# Patient Record
Sex: MALE | Race: BLACK OR AFRICAN AMERICAN | NOT HISPANIC OR LATINO | URBAN - METROPOLITAN AREA
[De-identification: names, ages, dates, MRNs, and addresses within clinical notes are randomized per-mention and may not be internally consistent; named-entity substitution may affect disease eponyms.]

---

## 2021-03-10 ENCOUNTER — LAB OUTSIDE AN ENCOUNTER (OUTPATIENT)
Dept: URBAN - METROPOLITAN AREA TELEHEALTH 2 | Facility: TELEHEALTH | Age: 51
End: 2021-03-10

## 2021-03-10 ENCOUNTER — OFFICE VISIT (OUTPATIENT)
Dept: URBAN - METROPOLITAN AREA TELEHEALTH 2 | Facility: TELEHEALTH | Age: 51
End: 2021-03-10
Payer: COMMERCIAL

## 2021-03-10 DIAGNOSIS — I10 ELEVATED BLOOD PRESSURE: ICD-10-CM

## 2021-03-10 DIAGNOSIS — E78.5 HYPERLIPIDEMIA: ICD-10-CM

## 2021-03-10 DIAGNOSIS — Z79.899 HIGH RISK MEDICATION USE: ICD-10-CM

## 2021-03-10 DIAGNOSIS — B18.1 CHRONIC HEPATITIS B: ICD-10-CM

## 2021-03-10 PROBLEM — 428283002: Status: ACTIVE | Noted: 2021-03-10

## 2021-03-10 PROCEDURE — 99214 OFFICE O/P EST MOD 30 MIN: CPT

## 2021-03-10 RX ORDER — TENOFOVIR ALAFENAMIDE 25 MG/1
TAKE 1 TABLET (25 MG) BY ORAL ROUTE ONCE DAILY FOR 30 DAYS TABLET ORAL 1
Qty: 30 | Refills: 5 | Status: DISCONTINUED | COMMUNITY
Start: 2017-12-19

## 2021-03-10 NOTE — HPI-OTHER HISTORIES
This is a scheduled follow-up appointment for this patient, a 50 year old /Black male, after a previous visit on dec 2017, for an evaluation for hepatitis B on treatment evaluation.   Pt has been lost to follow up since 2017.   from his wife but trying to get back.  He is working now in [employer] in [place],  Hoping to get back to [place] this year.  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  He has a new insurance since when last saw.  Need to get labs and need and an u.s.  He is living [place].  Prior to the vemlidy for 2 years were on Viread continuously on this x 5 yrs.   Nov 7 2017: wbc 5.8 hg 15.5 plat 206, glu 119 and little up. Bun 11 cr 1.10, na 144, k 4.2, alb 4.6, tb 0.5, alk 74, ast 24 and alt 19 and hbv dna less than10 and detected.  and amylase and lipase normal. April 2017: wbc 6.8, hg 16.5 plat 224. glu 102, bun 12 cr 1.06, na 142 k 4.4, ast 18 and alt 23 hbv dna not detected. cpk 130 and anymlase and lipase. 73 and 19.   U.s Dec 2017 liver coarsened byt no cirrhotic. Spleen normal.   April 2017 u/s: exam within normal limits. Vessels patent. No gallstones and no liver lesions. Spleen not enlarged.  HBV E antigen is still positive as of that check.   Sept 2015: wbc 7.4, hg 16.3, plat 249. glu 81, cr 1.27, na 144 k 4.3, alk 81 and ast and alt 26 and 24. HBV pcr not detected.   June 30 2015: wbc 6.0, hg 15.8, plat 226, glu 83, bun 11, cr 1.27, ast 27 and alt 22. tb 0.5. HBV less than20 not detected. afp 2.7. eag pos and eab neg.   Stressed to pt the need for social distancing and strict handwashing and wearing a mask and to follow any other new or added CDC recommendations as this is an evolving target.  Duration of visit  30 minutes via [employer] with greater than 50% of time spent reviewing chart and events with pt.

## 2021-04-05 ENCOUNTER — OFFICE VISIT (OUTPATIENT)
Dept: URBAN - METROPOLITAN AREA CLINIC 83 | Facility: CLINIC | Age: 51
End: 2021-04-05

## 2021-05-17 ENCOUNTER — OFFICE VISIT (OUTPATIENT)
Dept: URBAN - METROPOLITAN AREA CLINIC 83 | Facility: CLINIC | Age: 51
End: 2021-05-17

## 2021-06-09 ENCOUNTER — OFFICE VISIT (OUTPATIENT)
Dept: URBAN - METROPOLITAN AREA TELEHEALTH 2 | Facility: TELEHEALTH | Age: 51
End: 2021-06-09

## 2021-06-10 ENCOUNTER — LAB OUTSIDE AN ENCOUNTER (OUTPATIENT)
Dept: URBAN - METROPOLITAN AREA TELEHEALTH 2 | Facility: TELEHEALTH | Age: 51
End: 2021-06-10

## 2021-07-02 ENCOUNTER — OFFICE VISIT (OUTPATIENT)
Dept: URBAN - METROPOLITAN AREA CLINIC 91 | Facility: CLINIC | Age: 51
End: 2021-07-02
Payer: COMMERCIAL

## 2021-07-02 DIAGNOSIS — B18.1 CHRONIC HEPATITIS B: ICD-10-CM

## 2021-07-02 PROCEDURE — 76705 ECHO EXAM OF ABDOMEN: CPT

## 2021-07-02 PROCEDURE — 93975 VASCULAR STUDY: CPT

## 2021-07-04 ENCOUNTER — TELEPHONE ENCOUNTER (OUTPATIENT)
Dept: URBAN - METROPOLITAN AREA CLINIC 92 | Facility: CLINIC | Age: 51
End: 2021-07-04

## 2021-07-04 NOTE — HPI-TODAY'S VISIT:
Dear Dev Hercules, July 2 ultrasound back.  They compared it to your prior 2017 scan.  Body of pancreas was unremarkable but portions of it were not well seen due to gas. Liver remains homogeneous with no discrete liver lesion.  Very important to do this every 6 months. Liver vessels patent. Gallbladder unremarkable with no stones seen.  Common bile duct normal at 3 mm. Right kidney 10.3 cm with no hydronephrosis. Spleen 11.9 cm. Awaiting labs to correlate to this. Dr. Clark

## 2021-08-02 ENCOUNTER — LAB OUTSIDE AN ENCOUNTER (OUTPATIENT)
Dept: URBAN - METROPOLITAN AREA TELEHEALTH 2 | Facility: TELEHEALTH | Age: 51
End: 2021-08-02

## 2021-08-02 ENCOUNTER — OFFICE VISIT (OUTPATIENT)
Dept: URBAN - METROPOLITAN AREA TELEHEALTH 2 | Facility: TELEHEALTH | Age: 51
End: 2021-08-02
Payer: COMMERCIAL

## 2021-08-02 DIAGNOSIS — E66.3 OVERWEIGHT: ICD-10-CM

## 2021-08-02 DIAGNOSIS — I10 ELEVATED BLOOD PRESSURE: ICD-10-CM

## 2021-08-02 DIAGNOSIS — Z79.899 HIGH RISK MEDICATION USE: ICD-10-CM

## 2021-08-02 DIAGNOSIS — B18.1 CHRONIC HEPATITIS B: ICD-10-CM

## 2021-08-02 PROCEDURE — 99214 OFFICE O/P EST MOD 30 MIN: CPT

## 2021-08-02 NOTE — HPI-OTHER HISTORIES
This is a scheduled follow-up appointment for this patient, a 50 year old /Black male, after a previous visit on March 2021 and prior dec 2017, for an evaluation for hepatitis B on treatment evaluation.   Pt had been lost to follow up since 2017 and march 2021.   from his wife but trying to get back but stil  but close. Support each other.  He still working now in [employer] in [place].  Hoping to get back to [place] this year and says that may happen in 4m.  Prior to the vemlidy for 2 years were on Viread continuously on this x 5 yrs.   He has been off the medicine for a while.   July 2 ultrasound back.  They compared it to your prior 2017 scan.  Body of pancreas was unremarkable but portions of it were not well seen due to gas. Liver remains homogeneous with no discrete liver lesion.  Very important to do this every 6 months. Liver vessels patent. Gallbladder unremarkable with no stones seen.  Common bile duct normal at 3 mm. Right kidney 10.3 cm with no hydronephrosis. Spleen 11.9 cm.  He says he thought he did the labs.  We need the labs asap.  Nov 7 2017: wbc 5.8 hg 15.5 plat 206, glu 119 and little up. Bun 11 cr 1.10, na 144, k 4.2, alb 4.6, tb 0.5, alk 74, ast 24 and alt 19 and hbv dna less than10 and detected.  and amylase and lipase normal. April 2017: wbc 6.8, hg 16.5 plat 224. glu 102, bun 12 cr 1.06, na 142 k 4.4, ast 18 and alt 23 hbv dna not detected. cpk 130 and anymlase and lipase. 73 and 19.   U.s Dec 2017 liver coarsened byt no cirrhotic. Spleen normal.   April 2017 u/s: exam within normal limits. Vessels patent. No gallstones and no liver lesions. Spleen not enlarged.  HBV E antigen is still positive as of that check.   Sept 2015: wbc 7.4, hg 16.3, plat 249. glu 81, cr 1.27, na 144 k 4.3, alk 81 and ast and alt 26 and 24. HBV pcr not detected.   June 30 2015: wbc 6.0, hg 15.8, plat 226, glu 83, bun 11, cr 1.27, ast 27 and alt 22. tb 0.5. HBV less than20 not detected. afp 2.7. eag pos and eab neg.   Plan: 1. Do the labs now to be sure. 2. If dna is elevated and pending labs then we can try to get back on the vemlidy as had renal issues on viread and better on the other. 3. We need the labs. 4.  If the labs show issue then can do this.  Stressed to pt the need for social distancing and strict handwashing and wearing a mask and to follow any other new or added CDC recommendations as this is an evolving target.  Duration of visit 30 minutes total with 5 min of prep and then 25 min via [employer] from 205 to 230 pm with greater than 50% of time spent reviewing chart and events with pt.

## 2021-08-20 ENCOUNTER — OFFICE VISIT (OUTPATIENT)
Dept: URBAN - METROPOLITAN AREA CLINIC 42 | Facility: CLINIC | Age: 51
End: 2021-08-20

## 2021-08-20 VITALS — HEIGHT: 71 IN

## 2021-08-21 ENCOUNTER — TELEPHONE ENCOUNTER (OUTPATIENT)
Dept: URBAN - METROPOLITAN AREA CLINIC 92 | Facility: CLINIC | Age: 51
End: 2021-08-21

## 2021-08-21 LAB
A/G RATIO: 1.9
ALBUMIN: 4.4
ALKALINE PHOSPHATASE: 90
ALT (SGPT): 17
AST (SGOT): 22
BASO (ABSOLUTE): 0.1
BASOS: 1
BILIRUBIN, TOTAL: 0.2
BUN/CREATININE RATIO: 11
BUN: 11
CALCIUM: 9.7
CARBON DIOXIDE, TOTAL: 27
CHLORIDE: 105
CREATININE: 1.02
EGFR IF AFRICN AM: 97
EGFR IF NONAFRICN AM: 84
EOS (ABSOLUTE): 0.1
EOS: 2
GLOBULIN, TOTAL: 2.3
GLUCOSE: 80
HBSAG SCREEN: POSITIVE
HBV LOG10: 1.84
HEMATOCRIT: 48.1
HEMATOLOGY COMMENTS:: (no result)
HEMOGLOBIN: 16.1
HEPATITIS B QUANTITATION: 70
HEPATITIS B SURF AB QUANT: <3.1
IMMATURE CELLS: (no result)
IMMATURE GRANS (ABS): 0
IMMATURE GRANULOCYTES: 0
LYMPHS (ABSOLUTE): 2
LYMPHS: 34
MCH: 31
MCHC: 33.5
MCV: 93
MONOCYTES(ABSOLUTE): 0.5
MONOCYTES: 8
NEUTROPHILS (ABSOLUTE): 3.2
NEUTROPHILS: 55
NRBC: (no result)
PLATELETS: 236
POTASSIUM: 4.2
PROTEIN, TOTAL: 6.7
RBC: 5.19
RDW: 12.8
SODIUM: 145
TEST INFORMATION:: (no result)
WBC: 5.9

## 2021-08-21 RX ORDER — TENOFOVIR ALAFENAMIDE 25 MG/1
TAKE 1 TABLET BY MOUTH DAILY TABLET ORAL ONCE A DAY
Qty: 30 | Refills: 5 | OUTPATIENT
Start: 2021-08-21 | End: 2022-02-16

## 2021-08-21 NOTE — HPI-TODAY'S VISIT:
Dear Dev Hercules, August 13 labs finally back.  White blood cell count 5.9 hemoglobin 16.1 plate count 236 MCV 93.  Neutrophils normal at 3.2.  Glucose 80 BUN of 11 creatinine 1.02.  These are normal.  Sodium slightly up at 145.  Potassium 4.2 chloride 105 CO2 27 albumin 4.4 bilirubin 0.2 alkaline phosphatase 90 AST 22 ALT 17.  Ideal ALT is less than 35 so you are doing well there.  Hep B level is positive at 71 obviously would like to see it undetectable.  Hep B surface antibody has not formed and likely will not unless we keep the virus level undetected and hopefully see the surface antigen clear.  I know it is very rare but it is noted occasionally in some patients.  Hep B surface antigen remains positive. Now that we have labs we can order the vemlidy again to get you back on this as you had renal issues prior with Viread and this justifies that. Once back on this we need to get the labs again in 1m and 3m. We will send to [facility] to see if they can get it approved. They are good at getting this done. Called pt to notify him of the labs and results. Not able to get so sent via portal the info. Rubia will mail the orders. Dr Clark

## 2021-09-20 ENCOUNTER — LAB OUTSIDE AN ENCOUNTER (OUTPATIENT)
Dept: URBAN - METROPOLITAN AREA CLINIC 92 | Facility: CLINIC | Age: 51
End: 2021-09-20

## 2021-10-01 ENCOUNTER — OFFICE VISIT (OUTPATIENT)
Dept: URBAN - METROPOLITAN AREA CLINIC 42 | Facility: CLINIC | Age: 51
End: 2021-10-01
Payer: COMMERCIAL

## 2021-10-01 ENCOUNTER — WEB ENCOUNTER (OUTPATIENT)
Dept: URBAN - METROPOLITAN AREA CLINIC 42 | Facility: CLINIC | Age: 51
End: 2021-10-01

## 2021-10-01 VITALS
HEART RATE: 91 BPM | BODY MASS INDEX: 28.84 KG/M2 | HEIGHT: 71 IN | TEMPERATURE: 97.3 F | WEIGHT: 206 LBS | SYSTOLIC BLOOD PRESSURE: 154 MMHG | DIASTOLIC BLOOD PRESSURE: 99 MMHG | RESPIRATION RATE: 21 BRPM

## 2021-10-01 DIAGNOSIS — B18.1 CHRONIC HEPATITIS B: ICD-10-CM

## 2021-10-01 DIAGNOSIS — Z83.71 FAMILY HISTORY OF COLONIC POLYPS: ICD-10-CM

## 2021-10-01 PROCEDURE — 99213 OFFICE O/P EST LOW 20 MIN: CPT | Performed by: INTERNAL MEDICINE

## 2021-10-01 RX ORDER — SODIUM PICOSULFATE, MAGNESIUM OXIDE, AND ANHYDROUS CITRIC ACID 10; 3.5; 12 MG/160ML; G/160ML; G/160ML
160 ML LIQUID ORAL
Qty: 320 MILLILITER | Refills: 0 | OUTPATIENT
Start: 2021-10-01 | End: 2021-10-02

## 2021-10-01 RX ORDER — TENOFOVIR ALAFENAMIDE 25 MG/1
TAKE 1 TABLET BY MOUTH DAILY TABLET ORAL ONCE A DAY
Qty: 30 | Refills: 5 | Status: ACTIVE | COMMUNITY
Start: 2021-08-21 | End: 2022-02-16

## 2021-10-01 NOTE — HPI-TODAY'S VISIT:
The patient is a 51 yo male with hepatitis B who is here to schedule a colonoscopy.  He is asymptomatic but does have a family hx of colon polyps.

## 2021-11-02 ENCOUNTER — LAB OUTSIDE AN ENCOUNTER (OUTPATIENT)
Dept: URBAN - METROPOLITAN AREA TELEHEALTH 2 | Facility: TELEHEALTH | Age: 51
End: 2021-11-02

## 2021-11-16 ENCOUNTER — OFFICE VISIT (OUTPATIENT)
Dept: URBAN - METROPOLITAN AREA SURGERY CENTER 13 | Facility: SURGERY CENTER | Age: 51
End: 2021-11-16
Payer: COMMERCIAL

## 2021-11-16 DIAGNOSIS — Z86.010 ADENOMAS PERSONAL HISTORY OF COLONIC POLYPS: ICD-10-CM

## 2021-11-16 PROCEDURE — 45378 DIAGNOSTIC COLONOSCOPY: CPT | Performed by: INTERNAL MEDICINE

## 2021-11-16 PROCEDURE — G8907 PT DOC NO EVENTS ON DISCHARG: HCPCS | Performed by: INTERNAL MEDICINE

## 2021-11-16 RX ORDER — TENOFOVIR ALAFENAMIDE 25 MG/1
TAKE 1 TABLET BY MOUTH DAILY TABLET ORAL ONCE A DAY
Qty: 30 | Refills: 5 | Status: ACTIVE | COMMUNITY
Start: 2021-08-21 | End: 2022-02-16

## 2021-11-20 ENCOUNTER — LAB OUTSIDE AN ENCOUNTER (OUTPATIENT)
Dept: URBAN - METROPOLITAN AREA CLINIC 92 | Facility: CLINIC | Age: 51
End: 2021-11-20

## 2021-12-03 ENCOUNTER — OFFICE VISIT (OUTPATIENT)
Dept: URBAN - METROPOLITAN AREA CLINIC 77 | Facility: CLINIC | Age: 51
End: 2021-12-03

## 2021-12-06 ENCOUNTER — LAB OUTSIDE AN ENCOUNTER (OUTPATIENT)
Dept: URBAN - METROPOLITAN AREA TELEHEALTH 2 | Facility: TELEHEALTH | Age: 51
End: 2021-12-06

## 2021-12-08 ENCOUNTER — OFFICE VISIT (OUTPATIENT)
Dept: URBAN - METROPOLITAN AREA TELEHEALTH 2 | Facility: TELEHEALTH | Age: 51
End: 2021-12-08

## 2022-01-05 ENCOUNTER — OFFICE VISIT (OUTPATIENT)
Dept: URBAN - METROPOLITAN AREA CLINIC 77 | Facility: CLINIC | Age: 52
End: 2022-01-05

## 2022-01-09 ENCOUNTER — ERX REFILL RESPONSE (OUTPATIENT)
Dept: URBAN - METROPOLITAN AREA CLINIC 86 | Facility: CLINIC | Age: 52
End: 2022-01-09

## 2022-01-09 RX ORDER — TENOFOVIR ALAFENAMIDE 25 MG/1
TAKE 1 TABLET BY MOUTH DAILY TABLET ORAL ONCE A DAY
Qty: 30 | Refills: 5 | OUTPATIENT

## 2022-01-09 RX ORDER — TENOFOVIR ALAFENAMIDE 25 MG/1
TAKE 1 TABLET BY MOUTH ONCE DAILY WITH FOOD TABLET ORAL
Qty: 30 TABLET | Refills: 1 | OUTPATIENT

## 2022-02-25 ENCOUNTER — OFFICE VISIT (OUTPATIENT)
Dept: URBAN - METROPOLITAN AREA CLINIC 77 | Facility: CLINIC | Age: 52
End: 2022-02-25

## 2022-03-03 ENCOUNTER — ERX REFILL RESPONSE (OUTPATIENT)
Dept: URBAN - METROPOLITAN AREA CLINIC 86 | Facility: CLINIC | Age: 52
End: 2022-03-03

## 2022-03-03 RX ORDER — TENOFOVIR ALAFENAMIDE 25 MG/1
TAKE 1 TABLET BY MOUTH ONCE DAILY WITH FOOD TABLET ORAL
Qty: 30 TABLET | Refills: 1 | OUTPATIENT

## 2022-03-04 ENCOUNTER — LAB OUTSIDE AN ENCOUNTER (OUTPATIENT)
Dept: URBAN - METROPOLITAN AREA CLINIC 86 | Facility: CLINIC | Age: 52
End: 2022-03-04

## 2022-03-08 ENCOUNTER — WEB ENCOUNTER (OUTPATIENT)
Dept: URBAN - METROPOLITAN AREA TELEHEALTH 2 | Facility: TELEHEALTH | Age: 52
End: 2022-03-08

## 2022-03-09 ENCOUNTER — OFFICE VISIT (OUTPATIENT)
Dept: URBAN - METROPOLITAN AREA TELEHEALTH 2 | Facility: TELEHEALTH | Age: 52
End: 2022-03-09
Payer: COMMERCIAL

## 2022-03-09 DIAGNOSIS — Z12.11 SCREENING COLONOSCOPY: ICD-10-CM

## 2022-03-09 DIAGNOSIS — E78.5 HYPERLIPIDEMIA: ICD-10-CM

## 2022-03-09 DIAGNOSIS — Z79.899 HIGH RISK MEDICATION USE: ICD-10-CM

## 2022-03-09 DIAGNOSIS — E55.9 VITAMIN D DEFICIENCY: ICD-10-CM

## 2022-03-09 DIAGNOSIS — Z71.89 VACCINE COUNSELING: ICD-10-CM

## 2022-03-09 DIAGNOSIS — M25.519: ICD-10-CM

## 2022-03-09 DIAGNOSIS — I10 ELEVATED BLOOD PRESSURE: ICD-10-CM

## 2022-03-09 DIAGNOSIS — Z83.71 FAMILY HISTORY OF COLONIC POLYPS: ICD-10-CM

## 2022-03-09 DIAGNOSIS — B18.1 CHRONIC HEPATITIS B: ICD-10-CM

## 2022-03-09 DIAGNOSIS — E66.3 OVERWEIGHT: ICD-10-CM

## 2022-03-09 PROCEDURE — 99214 OFFICE O/P EST MOD 30 MIN: CPT

## 2022-03-09 RX ORDER — TENOFOVIR ALAFENAMIDE 25 MG/1
TAKE 1 TABLET BY MOUTH ONCE DAILY WITH FOOD TABLET ORAL
Qty: 30 | Refills: 5

## 2022-03-09 RX ORDER — TENOFOVIR ALAFENAMIDE 25 MG/1
TAKE 1 TABLET BY MOUTH ONCE DAILY WITH FOOD TABLET ORAL
Qty: 30 TABLET | Refills: 1 | Status: ACTIVE | COMMUNITY

## 2022-03-09 NOTE — HPI-OTHER HISTORIES
This is a scheduled follow-up appointment for this patient, a 51 year old /Black male, after a previous visit on Aug 2021 and prior dec 2017, for an evaluation for hepatitis B on treatment evaluation.   He has done the labs and he did them friday and so christine to get the prelim.  U.s to do march 25 to do that.   August 13 labs finally back.  White blood cell count 5.9 hemoglobin 16.1 plate count 236 MCV 93.  Neutrophils normal at 3.2.  Glucose 80 BUN of 11 creatinine 1.02.  These are normal.  Sodium slightly up at 145.  Potassium 4.2 chloride 105 CO2 27 albumin 4.4 bilirubin 0.2 alkaline phosphatase 90 AST 22 ALT 17.  Ideal ALT is less than 35 so you are doing well there.  Hep B level is positive at 71 obviously would like to see it undetectable.  Hep B surface antibody has not formed and likely will not unless we keep the virus level undetected and hopefully see the surface antigen clears.  Hep B surface antigen remains positive.  We sent to [facility] to see if they can get it approved.  Says that they have it approved.  Taking the vemlidy.  July 2 2021 ultrasound back.  They compared it to your prior 2017 scan.  Body of pancreas was unremarkable but portions of it were not well seen due to gas. Liver remains homogeneous with no discrete liver lesion.  Very important to do this every 6 months. Liver vessels patent. Gallbladder unremarkable with no stones seen.  Common bile duct normal at 3 mm. Right kidney 10.3 cm with no hydronephrosis. Spleen 11.9 cm. Awaiting labs to correlate to this.  Pt had been lost to follow up since 2017 and march 2021.   from his wife but trying to get back but still  but close but they support each other.  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  Prior to the vemlidy for 2 years were on Viread continuously on this x 5 yrs.   July 2 ultrasound back.  They compared it to your prior 2017 scan.  Body of pancreas was unremarkable but portions of it were not well seen due to gas. Liver remains homogeneous with no discrete liver lesion.  Very important to do this every 6 months. Liver vessels patent. Gallbladder unremarkable with no stones seen.  Common bile duct normal at 3 mm. Right kidney 10.3 cm with no hydronephrosis. Spleen 11.9 cm.  He says he thought he did the labs.  Nov 7 2017: wbc 5.8 hg 15.5 plat 206, glu 119 and little up. Bun 11 cr 1.10, na 144, k 4.2, alb 4.6, tb 0.5, alk 74, ast 24 and alt 19 and hbv dna less than10 and detected.  and amylase and lipase normal. April 2017: wbc 6.8, hg 16.5 plat 224. glu 102, bun 12 cr 1.06, na 142 k 4.4, ast 18 and alt 23 hbv dna not detected. cpk 130 and anymlase and lipase. 73 and 19.   U.s Dec 2017 liver coarsened byt no cirrhotic. Spleen normal.   April 2017 u/s: exam within normal limits. Vessels patent. No gallstones and no liver lesions. Spleen not enlarged.  HBV E antigen is still positive as of that check.   Sept 2015: wbc 7.4, hg 16.3, plat 249. glu 81, cr 1.27, na 144 k 4.3, alk 81 and ast and alt 26 and 24. HBV pcr not detected.   June 30 2015: wbc 6.0, hg 15.8, plat 226, glu 83, bun 11, cr 1.27, ast 27 and alt 22. tb 0.5. HBV less than20 not detected. afp 2.7. eag pos and eab neg.   Plan: 1. Did the labs and u.s pending. 2. Pt will call if issues. 3. Pt will do the labs in 3 and 6m. 4.  Set up the u.,s in 6m. 5. Need to be sure hbv neg again.  Stressed to pt the need for social distancing and strict handwashing and wearing a mask and to follow any other new or added CDC recommendations as this is an evolving target.  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

## 2022-03-10 ENCOUNTER — TELEPHONE ENCOUNTER (OUTPATIENT)
Dept: URBAN - METROPOLITAN AREA CLINIC 92 | Facility: CLINIC | Age: 52
End: 2022-03-10

## 2022-03-10 LAB
A/G RATIO: 1.8
ALBUMIN: 4.8
ALKALINE PHOSPHATASE: 101
ALT (SGPT): 15
AST (SGOT): 21
BASO (ABSOLUTE): 0.1
BASOS: 1
BILIRUBIN, TOTAL: 0.3
BUN/CREATININE RATIO: 10
BUN: 12
CALCIUM: 9.9
CARBON DIOXIDE, TOTAL: 23
CHLORIDE: 104
CREATININE: 1.21
EGFR: 72
EOS (ABSOLUTE): 0.1
EOS: 2
GLOBULIN, TOTAL: 2.7
GLUCOSE: 93
HBSAG CONFIRMATION: POSITIVE
HBSAG SCREEN: (no result)
HBV LOG10: (no result)
HEMATOCRIT: 47
HEMATOLOGY COMMENTS:: (no result)
HEMOGLOBIN: 16.4
HEPATITIS B QUANTITATION: (no result)
IMMATURE CELLS: (no result)
IMMATURE GRANS (ABS): 0
IMMATURE GRANULOCYTES: 0
LYMPHS (ABSOLUTE): 1.6
LYMPHS: 24
MCH: 31
MCHC: 34.9
MCV: 89
MONOCYTES(ABSOLUTE): 0.5
MONOCYTES: 8
NEUTROPHILS (ABSOLUTE): 4.5
NEUTROPHILS: 65
NRBC: (no result)
PLATELETS: 269
POTASSIUM: 4.6
PROTEIN, TOTAL: 7.5
RBC: 5.29
RDW: 12.5
SODIUM: 143
TEST INFORMATION:: (no result)
WBC: 6.7

## 2022-03-10 NOTE — HPI-TODAY'S VISIT:
Dear Dev Hercules, March 4 labs show glucose 93 BUN of 12 creatinine slightly up at 1.21 from 1.02 before.  That could potentially be from your hydrational state vs medicine role. You prior have not had issues on the medicine. Suspect due to your work that you may have been running a little bit dehydrated that day it would appear little higher. Need to watch her hydration state to make sure that that stays optimized. Sodium 143 potassium 4.6 chloride 104 CO2 23 albumin 4.8 bilirubin 0.3 alkaline phosphatase 101 AST 21 ALT 15.  Previously AST 22 and ALT 17 so these remain stable. Hep B DNA back to not detected again from previous 70. White cell count 6.7 hemoglobin 16.4 plate count 269 MCV 89.  Neutrophils 4.5 and lymphocytes 1.6. Hep B surface antigen remains positive. Given the work that you are doing not a surprise that you may be running a little bit dehydrated and you need to keep again on top of the hydrational status. Please redo your kidney function labs in 1 week to be sure that they are ok (be sure to be hydrated for the labs) and if the labs are ok to do them again for us in . Dr. Clark

## 2022-03-25 ENCOUNTER — LAB OUTSIDE AN ENCOUNTER (OUTPATIENT)
Dept: URBAN - METROPOLITAN AREA CLINIC 92 | Facility: CLINIC | Age: 52
End: 2022-03-25

## 2022-03-25 ENCOUNTER — OFFICE VISIT (OUTPATIENT)
Dept: URBAN - METROPOLITAN AREA CLINIC 77 | Facility: CLINIC | Age: 52
End: 2022-03-25
Payer: COMMERCIAL

## 2022-03-25 DIAGNOSIS — B18.1 CHRONIC HEPATITIS B: ICD-10-CM

## 2022-03-25 PROCEDURE — 93975 VASCULAR STUDY: CPT

## 2022-03-25 PROCEDURE — 76705 ECHO EXAM OF ABDOMEN: CPT

## 2022-03-25 RX ORDER — TENOFOVIR ALAFENAMIDE 25 MG/1
TAKE 1 TABLET BY MOUTH ONCE DAILY WITH FOOD TABLET ORAL
Qty: 30 | Refills: 5 | Status: ACTIVE | COMMUNITY

## 2022-03-26 ENCOUNTER — TELEPHONE ENCOUNTER (OUTPATIENT)
Dept: URBAN - METROPOLITAN AREA CLINIC 92 | Facility: CLINIC | Age: 52
End: 2022-03-26

## 2022-03-26 RX ORDER — TENOFOVIR ALAFENAMIDE 25 MG/1
TAKE 1 TABLET BY MOUTH ONCE DAILY WITH FOOD TABLET ORAL
Qty: 30 | Refills: 3

## 2022-03-28 ENCOUNTER — TELEPHONE ENCOUNTER (OUTPATIENT)
Dept: URBAN - METROPOLITAN AREA CLINIC 92 | Facility: CLINIC | Age: 52
End: 2022-03-28

## 2022-03-28 NOTE — HPI-TODAY'S VISIT:
Dear Dev Hercules, March 25:  U.s pancreas portions normal in size. Liver heterogeneous in echogenicity. No liver lesions. Liver vessels patent. Gallbladder is normal.  Common duct 3mm. Right kidney 10.6cm.  Spleen 11.8cm. Plan to redo in 6m. Dr Clark

## 2022-05-27 ENCOUNTER — LAB OUTSIDE AN ENCOUNTER (OUTPATIENT)
Dept: URBAN - METROPOLITAN AREA CLINIC 92 | Facility: CLINIC | Age: 52
End: 2022-05-27

## 2022-05-27 ENCOUNTER — TELEPHONE ENCOUNTER (OUTPATIENT)
Dept: URBAN - METROPOLITAN AREA CLINIC 92 | Facility: CLINIC | Age: 52
End: 2022-05-27

## 2022-06-01 ENCOUNTER — LAB OUTSIDE AN ENCOUNTER (OUTPATIENT)
Dept: URBAN - METROPOLITAN AREA TELEHEALTH 2 | Facility: TELEHEALTH | Age: 52
End: 2022-06-01

## 2022-06-02 ENCOUNTER — TELEPHONE ENCOUNTER (OUTPATIENT)
Dept: URBAN - METROPOLITAN AREA CLINIC 92 | Facility: CLINIC | Age: 52
End: 2022-06-02

## 2022-06-02 LAB
A/G RATIO: 1.6
ALBUMIN: 4.9
ALKALINE PHOSPHATASE: 91
ALT (SGPT): 20
AST (SGOT): 23
BASO (ABSOLUTE): 0.1
BASOS: 1
BILIRUBIN, TOTAL: 0.7
BUN/CREATININE RATIO: 12
BUN: 14
CALCIUM: 10.1
CARBON DIOXIDE, TOTAL: 26
CHLORIDE: 103
CREATININE: 1.17
EGFR: 75
EOS (ABSOLUTE): 0.1
EOS: 1
GLOBULIN, TOTAL: 3
GLUCOSE: 87
HBV LOG10: (no result)
HEMATOCRIT: 53.4
HEMATOLOGY COMMENTS:: (no result)
HEMOGLOBIN: 17.9
HEPATITIS B QUANTITATION: (no result)
IMMATURE CELLS: (no result)
IMMATURE GRANS (ABS): 0
IMMATURE GRANULOCYTES: 0
INR: 1
LYMPHS (ABSOLUTE): 2
LYMPHS: 26
MCH: 30.2
MCHC: 33.5
MCV: 90
MONOCYTES(ABSOLUTE): 0.6
MONOCYTES: 8
NEUTROPHILS (ABSOLUTE): 4.7
NEUTROPHILS: 64
NRBC: (no result)
PLATELETS: 235
POTASSIUM: 4.3
PROTEIN, TOTAL: 7.9
PROTHROMBIN TIME: 10.9
RBC: 5.92
RDW: 12.9
SODIUM: 144
TEST INFORMATION:: (no result)
WBC: 7.4

## 2022-06-02 NOTE — HPI-TODAY'S VISIT:
Dear Dev Hercules, May 27: wbc 7.4 and hg 17.9 and hct 53.4 and and prior hg 16.4 so wonder if you were running a little dry that day as blood seems concentrated. MCV 90. Platelets 235 normal. Neutrophils 4.7 and lymphs 2.0. Glucose 87 and bun 14 and cr 1.17 and na 144 and k 4.3 and cl 103 and co2 26 and ca 10.1 and alb 4.9 and tb 0.7 and alk 91 and ast 23 and alt 20. Prior ast 21 and alt 15. HBV dna not detected. So medicine working well. Inr 1.0. May want to redo the cbc when well hydrated.  if hemoglobin stays up need to see a blood specialist. Dr Clark

## 2022-08-13 ENCOUNTER — ERX REFILL RESPONSE (OUTPATIENT)
Dept: URBAN - METROPOLITAN AREA CLINIC 86 | Facility: CLINIC | Age: 52
End: 2022-08-13

## 2022-08-13 RX ORDER — TENOFOVIR ALAFENAMIDE 25 MG/1
TAKE 1 TABLET BY MOUTH ONCE DAILY WITH FOOD TABLET ORAL
Qty: 30 | Refills: 3 | OUTPATIENT

## 2022-08-13 RX ORDER — TENOFOVIR ALAFENAMIDE 25 MG/1
TAKE 1 TABLET BY MOUTH WITH FOOD ONCE DAILY TABLET ORAL
Qty: 30 TABLET | Refills: 0 | OUTPATIENT

## 2022-09-01 ENCOUNTER — LAB OUTSIDE AN ENCOUNTER (OUTPATIENT)
Dept: URBAN - METROPOLITAN AREA TELEHEALTH 2 | Facility: TELEHEALTH | Age: 52
End: 2022-09-01

## 2022-09-08 ENCOUNTER — LAB OUTSIDE AN ENCOUNTER (OUTPATIENT)
Dept: URBAN - METROPOLITAN AREA TELEHEALTH 2 | Facility: TELEHEALTH | Age: 52
End: 2022-09-08

## 2022-09-09 ENCOUNTER — LAB OUTSIDE AN ENCOUNTER (OUTPATIENT)
Dept: URBAN - METROPOLITAN AREA CLINIC 86 | Facility: CLINIC | Age: 52
End: 2022-09-09

## 2022-09-09 ENCOUNTER — OFFICE VISIT (OUTPATIENT)
Dept: URBAN - METROPOLITAN AREA CLINIC 86 | Facility: CLINIC | Age: 52
End: 2022-09-09
Payer: COMMERCIAL

## 2022-09-09 ENCOUNTER — OFFICE VISIT (OUTPATIENT)
Dept: URBAN - METROPOLITAN AREA CLINIC 91 | Facility: CLINIC | Age: 52
End: 2022-09-09
Payer: COMMERCIAL

## 2022-09-09 ENCOUNTER — WEB ENCOUNTER (OUTPATIENT)
Dept: URBAN - METROPOLITAN AREA CLINIC 86 | Facility: CLINIC | Age: 52
End: 2022-09-09

## 2022-09-09 VITALS
WEIGHT: 220 LBS | DIASTOLIC BLOOD PRESSURE: 110 MMHG | TEMPERATURE: 97.1 F | BODY MASS INDEX: 30.8 KG/M2 | HEART RATE: 71 BPM | SYSTOLIC BLOOD PRESSURE: 153 MMHG | HEIGHT: 71 IN

## 2022-09-09 DIAGNOSIS — Z79.899 HIGH RISK MEDICATION USE: ICD-10-CM

## 2022-09-09 DIAGNOSIS — E55.9 VITAMIN D DEFICIENCY: ICD-10-CM

## 2022-09-09 DIAGNOSIS — E66.3 OVERWEIGHT: ICD-10-CM

## 2022-09-09 DIAGNOSIS — B18.1 CHRONIC HEPATITIS B: ICD-10-CM

## 2022-09-09 DIAGNOSIS — M25.519: ICD-10-CM

## 2022-09-09 DIAGNOSIS — I10 ELEVATED BLOOD PRESSURE: ICD-10-CM

## 2022-09-09 DIAGNOSIS — Z12.11 SCREENING COLONOSCOPY: ICD-10-CM

## 2022-09-09 DIAGNOSIS — Z71.89 VACCINE COUNSELING: ICD-10-CM

## 2022-09-09 DIAGNOSIS — E78.5 HYPERLIPIDEMIA: ICD-10-CM

## 2022-09-09 DIAGNOSIS — Z83.71 FAMILY HISTORY OF COLONIC POLYPS: ICD-10-CM

## 2022-09-09 PROBLEM — 267949000 SHOULDER JOINT PAIN: Status: ACTIVE | Noted: 2022-03-09

## 2022-09-09 PROCEDURE — 93975 VASCULAR STUDY: CPT

## 2022-09-09 PROCEDURE — 99214 OFFICE O/P EST MOD 30 MIN: CPT

## 2022-09-09 PROCEDURE — 76705 ECHO EXAM OF ABDOMEN: CPT

## 2022-09-09 RX ORDER — TENOFOVIR ALAFENAMIDE 25 MG/1
TAKE 1 TABLET BY MOUTH ONCE DAILY WITH FOOD TABLET ORAL
Qty: 30 | Refills: 5

## 2022-09-09 RX ORDER — ATORVASTATIN CALCIUM 10 MG/1
1 TABLET TABLET, FILM COATED ORAL ONCE A DAY
Status: ACTIVE | COMMUNITY

## 2022-09-09 RX ORDER — TENOFOVIR ALAFENAMIDE 25 MG/1
TAKE 1 TABLET BY MOUTH WITH FOOD ONCE DAILY TABLET ORAL
Qty: 30 TABLET | Refills: 0 | Status: ACTIVE | COMMUNITY

## 2022-09-09 RX ORDER — AMLODIPINE BESYLATE 10 MG/1
1 TABLET TABLET ORAL ONCE A DAY
Status: ACTIVE | COMMUNITY

## 2022-09-09 NOTE — HPI-TODAY'S VISIT:
This is a scheduled follow-up appointment for this patient, a 51 year old /Black male, after a previous visit on March 2022 for an evaluation for hepatitis B on treatment evaluation.   He will do labs today.  He did the u,s and pending.  May 27: wbc 7.4 and hg 17.9 and hct 53.4 and and prior hg 16.4 so wonder if you were running a little dry that day as blood seems concentrated. MCV 90. Platelets 235 normal. Neutrophils 4.7 and lymphs 2.0. Glucose 87 and bun 14 and cr 1.17 and na 144 and k 4.3 and cl 103 and co2 26 and ca 10.1 and alb 4.9 and tb 0.7 and alk 91 and ast 23 and alt 20. Prior ast 21 and alt 15. HBV dna not detected. So medicine working well. Inr 1.0. May want to redo the cbc when well hydrated.   Getting meds ok.  March 25:  U.s pancreas portions normal in size. Liver heterogeneous in echogenicity. No liver lesions. Liver vessels patent. Gallbladder is normal.  Common duct 3mm. Right kidney 10.6cm.  Spleen 11.8cm. Plan to redo in 6m. Dr Clark   March 4 labs show glucose 93 BUN of 12 creatinine slightly up at 1.21 from 1.02 before.  That could potentially be from your hydrational state vs medicine role. You prior have not had issues on the medicine. Suspect due to your work that you may have been running a little bit dehydrated that day it would appear little higher. Need to watch her hydration state to make sure that that stays optimized. Sodium 143 potassium 4.6 chloride 104 CO2 23 albumin 4.8 bilirubin 0.3 alkaline phosphatase 101 AST 21 ALT 15.  Previously AST 22 and ALT 17 so these remain stable. Hep B DNA back to not detected again from previous 70. White cell count 6.7 hemoglobin 16.4 plate count 269 MCV 89.  Neutrophils 4.5 and lymphocytes 1.6. Hep B surface antigen remains positive. Given the work that you are doing not a surprise that you may be running a little bit dehydrated and you need to keep again on top of the hydrational status.   August 13 2021 labs finally back.  White blood cell count 5.9 hemoglobin 16.1 plate count 236 MCV 93.  Neutrophils normal at 3.2.  Glucose 80 BUN of 11 creatinine 1.02.  These are normal.  Sodium slightly up at 145.  Potassium 4.2 chloride 105 CO2 27 albumin 4.4 bilirubin 0.2 alkaline phosphatase 90 AST 22 ALT 17.  Ideal ALT is less than 35 so you are doing well there.  Hep B level is positive at 71 obviously would like to see it undetectable.  Hep B surface antibody has not formed and likely will not unless we keep the virus level undetected and hopefully see the surface antigen clears.  Hep B surface antigen remains positive.  Taking the vemlidy.  July 2 2021 ultrasound back.  They compared it to your prior 2017 scan.  Body of pancreas was unremarkable but portions of it were not well seen due to gas. Liver remains homogeneous with no discrete liver lesion.  Very important to do this every 6 months. Liver vessels patent. Gallbladder unremarkable with no stones seen.  Common bile duct normal at 3 mm. Right kidney 10.3 cm with no hydronephrosis. Spleen 11.9 cm.  Pt had been lost to follow up since 2017 and march 2021.  he is working as a  and he wanda be back on road and staying with ex.  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  Prior to the vemlidy for 2 years were on Viread continuously on this x 5 yrs.   July 2 ultrasound back.  They compared it to your prior 2017 scan.  Body of pancreas was unremarkable but portions of it were not well seen due to gas. Liver remains homogeneous with no discrete liver lesion.  Very important to do this every 6 months. Liver vessels patent. Gallbladder unremarkable with no stones seen.  Common bile duct normal at 3 mm. Right kidney 10.3 cm with no hydronephrosis. Spleen 11.9 cm.  He says he thought he did the labs.  Nov 7 2017: wbc 5.8 hg 15.5 plat 206, glu 119 and little up. Bun 11 cr 1.10, na 144, k 4.2, alb 4.6, tb 0.5, alk 74, ast 24 and alt 19 and hbv dna less than10 and detected.  and amylase and lipase normal. April 2017: wbc 6.8, hg 16.5 plat 224. glu 102, bun 12 cr 1.06, na 142 k 4.4, ast 18 and alt 23 hbv dna not detected. cpk 130 and anymlase and lipase. 73 and 19.   U.s Dec 2017 liver coarsened byt no cirrhotic. Spleen normal.   April 2017 u/s: exam within normal limits. Vessels patent. No gallstones and no liver lesions. Spleen not enlarged.  HBV E antigen is still positive as of that check.   Sept 2015: wbc 7.4, hg 16.3, plat 249. glu 81, cr 1.27, na 144 k 4.3, alk 81 and ast and alt 26 and 24. HBV pcr not detected.   June 30 2015: wbc 6.0, hg 15.8, plat 226, glu 83, bun 11, cr 1.27, ast 27 and alt 22. tb 0.5. HBV less than20 not detected. afp 2.7. eag pos and eab neg.   Plan: 1. Doing the labs done today and then in 3 and 6m. 2. Staying on meds. 3. Pt will do u.s in 6m. 4. See us then.   Stressed to pt the need for social distancing and strict handwashing and wearing a mask and to follow any other new or added CDC recommendations as this is an evolving target.  Duration of visit 35 minutes total with 10 min of prep and then 25 min for the face to face visit with greater than 50% of time spent reviewing chart and events with pt.

## 2022-09-09 NOTE — EXAM-PHYSICAL EXAM
Gen: no distress, wearing a mask Eyes: anicteric and normal lids Mouth: wearing a mask Neck trachea midline and no jvd CV: RRR no s3 Lungs: Clear ant and no wheezes Abd: No tenderness or pain reported to exam and liver not enlarged and nabs and spleen normal Ext: no edema, mild palm erythema Neuro: Responsive and no distress and alert and oriented, no asterixis observed

## 2022-09-11 ENCOUNTER — TELEPHONE ENCOUNTER (OUTPATIENT)
Dept: URBAN - METROPOLITAN AREA CLINIC 92 | Facility: CLINIC | Age: 52
End: 2022-09-11

## 2022-09-11 NOTE — HPI-TODAY'S VISIT:
Gabriele Hercules, September 9 ultrasound shows liver to have normal echogenicity and no lesions. Gallbladder with no stones and no thickening. Common bile duct normal at 3.9 mm. Pancreas normal. Right kidney 11 cm.  Spleen 11 cm. Liver vessels patent.   Overall the liver had normal echogenicity and no suspicious lesions. Dr. Clark

## 2022-09-16 ENCOUNTER — TELEPHONE ENCOUNTER (OUTPATIENT)
Dept: URBAN - METROPOLITAN AREA CLINIC 92 | Facility: CLINIC | Age: 52
End: 2022-09-16

## 2022-09-16 LAB
A/G RATIO: 1.7
ABSOLUTE BASOPHILS: 58
ABSOLUTE EOSINOPHILS: 179
ABSOLUTE LYMPHOCYTES: 1658
ABSOLUTE MONOCYTES: 480
ABSOLUTE NEUTROPHILS: 4026
ALBUMIN: 4.8
ALKALINE PHOSPHATASE: 74
ALT (SGPT): 68
AST (SGOT): 51
BASOPHILS: 0.9
BILIRUBIN, TOTAL: 0.4
BUN/CREATININE RATIO: (no result)
BUN: 18
CALCIUM: 10
CARBON DIOXIDE, TOTAL: 30
CHLORIDE: 103
CREATININE: 1.11
EGFR: 79
EOSINOPHILS: 2.8
GLOBULIN, TOTAL: 2.8
GLUCOSE: 102
HEMATOCRIT: 48.1
HEMOGLOBIN: 16.8
HEPATITIS B SURFACE ANTIGEN: REACTIVE
HEPATITIS B VIRUS DNA: <1
HEPATITIS B VIRUS DNA: <10
HEPATITIS D ANTIBODY,: NEGATIVE
LYMPHOCYTES: 25.9
MCH: 32.1
MCHC: 34.9
MCV: 91.8
MONOCYTES: 7.5
MPV: 11.6
NEUTROPHILS: 62.9
PLATELET COUNT: 223
POTASSIUM: 4.6
PROTEIN, TOTAL: 7.6
RDW: 13.2
RED BLOOD CELL COUNT: 5.24
SODIUM: 143
WHITE BLOOD CELL COUNT: 6.4

## 2022-09-28 ENCOUNTER — LAB OUTSIDE AN ENCOUNTER (OUTPATIENT)
Dept: URBAN - METROPOLITAN AREA CLINIC 92 | Facility: CLINIC | Age: 52
End: 2022-09-28

## 2022-12-09 ENCOUNTER — LAB OUTSIDE AN ENCOUNTER (OUTPATIENT)
Dept: URBAN - METROPOLITAN AREA CLINIC 86 | Facility: CLINIC | Age: 52
End: 2022-12-09

## 2023-01-17 PROBLEM — 238131007: Status: ACTIVE | Noted: 2021-03-10

## 2023-03-06 ENCOUNTER — LAB OUTSIDE AN ENCOUNTER (OUTPATIENT)
Dept: URBAN - METROPOLITAN AREA CLINIC 86 | Facility: CLINIC | Age: 53
End: 2023-03-06

## 2023-03-09 ENCOUNTER — LAB OUTSIDE AN ENCOUNTER (OUTPATIENT)
Dept: URBAN - METROPOLITAN AREA CLINIC 86 | Facility: CLINIC | Age: 53
End: 2023-03-09

## 2023-05-02 ENCOUNTER — OFFICE VISIT (OUTPATIENT)
Dept: URBAN - METROPOLITAN AREA CLINIC 86 | Facility: CLINIC | Age: 53
End: 2023-05-02

## 2023-05-02 RX ORDER — TENOFOVIR ALAFENAMIDE 25 MG/1
TAKE 1 TABLET BY MOUTH ONCE DAILY WITH FOOD TABLET ORAL
Qty: 30 | Refills: 5

## 2023-05-02 NOTE — HPI-TODAY'S VISIT:
Pt is a 52 year old /Black male, after a previous visit on Sept 2022 for an evaluation for hepatitis B on treatment evaluation.   He will do labs today.  Dear Dev Hercules, Sept 9: hepatitis delta negative and good to know this. Your hep B surface antigen remains positive. Last time the hbv surface antigen was a little bit lower because they had to run it twice to confirm that it was positive.  We just need to watch that and make sure there is remains moving in the right direction. Hep B DNA remains low at less than 10 but the last 2 times it has been less than 10 but not detected. Any lapses in the dosing? Sugar slightly up at 102 BUN of 18 creatinine 1.11 sodium 143 potassium 4.6 calcium 10 albumin 4.8 bilirubin 0.4 alkaline phosphatase 74.  AST up to 51 and ALT 68 which is unusual for you.  Previously her AST was 23 and 20 cardiac 2115. White cell count 6.4 hemoglobin 16.8 plate count 223 MCV 91.8 neutrophils 4026 lymphocytes 1658. He had been off meds due to travel for his job and redo needed in 2 weeks to be sure that he is settling down. He is on the road and will do labs on the road. I will ask Rubia to see if send the labs electronically anywhere he can do a lab. He say he can also print off labs at some locations. Dr Clark Dear Dev Hercules, September 9 ultrasound shows liver to have normal echogenicity and no lesions. Gallbladder with no stones and no thickening. Common bile duct normal at 3.9 mm. Pancreas normal. Right kidney 11 cm.  Spleen 11 cm. Liver vessels patent.   Overall the liver had normal echogenicity and no suspicious lesions. Dr. Clark  May 27: wbc 7.4 and hg 17.9 and hct 53.4 and and prior hg 16.4 so wonder if you were running a little dry that day as blood seems concentrated. MCV 90. Platelets 235 normal. Neutrophils 4.7 and lymphs 2.0. Glucose 87 and bun 14 and cr 1.17 and na 144 and k 4.3 and cl 103 and co2 26 and ca 10.1 and alb 4.9 and tb 0.7 and alk 91 and ast 23 and alt 20. Prior ast 21 and alt 15. HBV dna not detected. So medicine working well. Inr 1.0. May want to redo the cbc when well hydrated.   Getting meds ok.  March 25:  U.s pancreas portions normal in size. Liver heterogeneous in echogenicity. No liver lesions. Liver vessels patent. Gallbladder is normal.  Common duct 3mm. Right kidney 10.6cm.  Spleen 11.8cm. Plan to redo in 6m. Dr Clark   March 4 labs show glucose 93 BUN of 12 creatinine slightly up at 1.21 from 1.02 before.  That could potentially be from your hydrational state vs medicine role. You prior have not had issues on the medicine. Suspect due to your work that you may have been running a little bit dehydrated that day it would appear little higher. Need to watch her hydration state to make sure that that stays optimized. Sodium 143 potassium 4.6 chloride 104 CO2 23 albumin 4.8 bilirubin 0.3 alkaline phosphatase 101 AST 21 ALT 15.  Previously AST 22 and ALT 17 so these remain stable. Hep B DNA back to not detected again from previous 70. White cell count 6.7 hemoglobin 16.4 plate count 269 MCV 89.  Neutrophils 4.5 and lymphocytes 1.6. Hep B surface antigen remains positive. Given the work that you are doing not a surprise that you may be running a little bit dehydrated and you need to keep again on top of the hydrational status.   August 13 2021 labs finally back.  White blood cell count 5.9 hemoglobin 16.1 plate count 236 MCV 93.  Neutrophils normal at 3.2.  Glucose 80 BUN of 11 creatinine 1.02.  These are normal.  Sodium slightly up at 145.  Potassium 4.2 chloride 105 CO2 27 albumin 4.4 bilirubin 0.2 alkaline phosphatase 90 AST 22 ALT 17.  Ideal ALT is less than 35 so you are doing well there.  Hep B level is positive at 71 obviously would like to see it undetectable.  Hep B surface antibody has not formed and likely will not unless we keep the virus level undetected and hopefully see the surface antigen clears.  Hep B surface antigen remains positive.  Taking the vemlidy.  July 2 2021 ultrasound back.  They compared it to your prior 2017 scan.  Body of pancreas was unremarkable but portions of it were not well seen due to gas. Liver remains homogeneous with no discrete liver lesion.  Very important to do this every 6 months. Liver vessels patent. Gallbladder unremarkable with no stones seen.  Common bile duct normal at 3 mm. Right kidney 10.3 cm with no hydronephrosis. Spleen 11.9 cm.  Pt had been lost to follow up since 2017 and march 2021.  he is working as a  and he wanda be back on road and staying with ex.  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  Prior to the vemlidy for 2 years were on Viread continuously on this x 5 yrs.   July 2 ultrasound back.  They compared it to your prior 2017 scan.  Body of pancreas was unremarkable but portions of it were not well seen due to gas. Liver remains homogeneous with no discrete liver lesion.  Very important to do this every 6 months. Liver vessels patent. Gallbladder unremarkable with no stones seen.  Common bile duct normal at 3 mm. Right kidney 10.3 cm with no hydronephrosis. Spleen 11.9 cm.  He says he thought he did the labs.  Nov 7 2017: wbc 5.8 hg 15.5 plat 206, glu 119 and little up. Bun 11 cr 1.10, na 144, k 4.2, alb 4.6, tb 0.5, alk 74, ast 24 and alt 19 and hbv dna less than10 and detected.  and amylase and lipase normal. April 2017: wbc 6.8, hg 16.5 plat 224. glu 102, bun 12 cr 1.06, na 142 k 4.4, ast 18 and alt 23 hbv dna not detected. cpk 130 and anymlase and lipase. 73 and 19.   U.s Dec 2017 liver coarsened byt no cirrhotic. Spleen normal.   April 2017 u/s: exam within normal limits. Vessels patent. No gallstones and no liver lesions. Spleen not enlarged.  HBV E antigen is still positive as of that check.   Sept 2015: wbc 7.4, hg 16.3, plat 249. glu 81, cr 1.27, na 144 k 4.3, alk 81 and ast and alt 26 and 24. HBV pcr not detected.   June 30 2015: wbc 6.0, hg 15.8, plat 226, glu 83, bun 11, cr 1.27, ast 27 and alt 22. tb 0.5. HBV less than20 not detected. afp 2.7. eag pos and eab neg.   Plan: 1. Doing the labs done today and then in 3 and 6m. 2. Staying on meds. 3. Pt will do u.s in 6m. 4. See us then.   Stressed to pt the need for social distancing and strict handwashing and wearing a mask and to follow any other new or added CDC recommendations as this is an evolving target.  Duration of visit 35 minutes total with 10 min of prep and then 25 min for the face to face visit with greater than 50% of time spent reviewing chart and events with pt.

## 2023-07-27 ENCOUNTER — OFFICE VISIT (OUTPATIENT)
Dept: URBAN - METROPOLITAN AREA CLINIC 91 | Facility: CLINIC | Age: 53
End: 2023-07-27
Payer: COMMERCIAL

## 2023-07-27 ENCOUNTER — OFFICE VISIT (OUTPATIENT)
Dept: URBAN - METROPOLITAN AREA CLINIC 86 | Facility: CLINIC | Age: 53
End: 2023-07-27
Payer: COMMERCIAL

## 2023-07-27 VITALS
BODY MASS INDEX: 31.64 KG/M2 | HEIGHT: 71 IN | WEIGHT: 226 LBS | HEART RATE: 90 BPM | DIASTOLIC BLOOD PRESSURE: 104 MMHG | TEMPERATURE: 97.1 F | SYSTOLIC BLOOD PRESSURE: 151 MMHG

## 2023-07-27 DIAGNOSIS — Z83.71 FAMILY HISTORY OF COLONIC POLYPS: ICD-10-CM

## 2023-07-27 DIAGNOSIS — E78.5 HYPERLIPIDEMIA: ICD-10-CM

## 2023-07-27 DIAGNOSIS — M25.519: ICD-10-CM

## 2023-07-27 DIAGNOSIS — B18.1 CHRONIC HEPATITIS B: ICD-10-CM

## 2023-07-27 DIAGNOSIS — Z12.11 SCREENING COLONOSCOPY: ICD-10-CM

## 2023-07-27 DIAGNOSIS — E55.9 VITAMIN D DEFICIENCY: ICD-10-CM

## 2023-07-27 DIAGNOSIS — I10 ELEVATED BLOOD PRESSURE: ICD-10-CM

## 2023-07-27 DIAGNOSIS — Z79.899 HIGH RISK MEDICATION USE: ICD-10-CM

## 2023-07-27 DIAGNOSIS — Z71.89 VACCINE COUNSELING: ICD-10-CM

## 2023-07-27 DIAGNOSIS — E66.3 OVERWEIGHT: ICD-10-CM

## 2023-07-27 PROCEDURE — 76705 ECHO EXAM OF ABDOMEN: CPT

## 2023-07-27 PROCEDURE — 99215 OFFICE O/P EST HI 40 MIN: CPT

## 2023-07-27 PROCEDURE — 93975 VASCULAR STUDY: CPT

## 2023-07-27 RX ORDER — AMLODIPINE BESYLATE 10 MG/1
1 TABLET TABLET ORAL ONCE A DAY
Status: ACTIVE | COMMUNITY

## 2023-07-27 RX ORDER — ATORVASTATIN CALCIUM 10 MG/1
1 TABLET TABLET, FILM COATED ORAL ONCE A DAY
Status: ACTIVE | COMMUNITY

## 2023-07-27 RX ORDER — TENOFOVIR ALAFENAMIDE 25 MG/1
TAKE 1 TABLET BY MOUTH ONCE DAILY WITH FOOD TABLET ORAL
Qty: 30 | Refills: 5

## 2023-07-27 RX ORDER — TENOFOVIR ALAFENAMIDE 25 MG/1
TAKE 1 TABLET BY MOUTH ONCE DAILY WITH FOOD TABLET ORAL
Qty: 30 | Refills: 5 | Status: ON HOLD | COMMUNITY

## 2023-07-27 NOTE — HPI-TODAY'S VISIT:
Pt is a 52 year old /Black male, after a previous visit on Sept 2022 for an evaluation for hepatitis B on treatment evaluation.   Did u.s and will do labs,  Had lost insurance and off the meds for 3m. Gave 3 weeks of Vemlidy to get him back on this asap and start appeal process for him to stay back on it.  Sept 9 2022: hepatitis delta negative and good to know this. Your hep B surface antigen remains positive. Last time the hbv surface antigen was a little bit lower because they had to run it twice to confirm that it was positive.  We just need to watch that and make sure there is remains moving in the right direction. Hep B DNA remains low at less than 10 but the last 2 times it has been less than 10 but not detected. Any lapses in the dosing? Sugar slightly up at 102 BUN of 18 creatinine 1.11 sodium 143 potassium 4.6 calcium 10 albumin 4.8 bilirubin 0.4 alkaline phosphatase 74.  AST up to 51 and ALT 68 which is unusual for you.  Previously her AST was 23 and 20 cardiac 2115. White cell count 6.4 hemoglobin 16.8 plate count 223 MCV 91.8 neutrophils 4026 lymphocytes 1658. He had been off meds due to travel for his job and redo needed in 2 weeks to be sure that he is settling down. He is on the road and will do labs on the road. I will ask Rubia to see if send the labs electronically anywhere he can do a lab.  September 9 2022 ultrasound shows liver to have normal echogenicity and no lesions. Gallbladder with no stones and no thickening. Common bile duct normal at 3.9 mm. Pancreas normal. Right kidney 11 cm.  Spleen 11 cm. Liver vessels patent.   Overall the liver had normal echogenicity and no suspicious lesions.  May 27: wbc 7.4 and hg 17.9 and hct 53.4 and and prior hg 16.4 so wonder if you were running a little dry that day as blood seems concentrated. MCV 90. Platelets 235 normal. Neutrophils 4.7 and lymphs 2.0. Glucose 87 and bun 14 and cr 1.17 and na 144 and k 4.3 and cl 103 and co2 26 and ca 10.1 and alb 4.9 and tb 0.7 and alk 91 and ast 23 and alt 20. Prior ast 21 and alt 15. HBV dna not detected. So medicine working well. Inr 1.0. May want to redo the cbc when well hydrated.   Getting meds ok.  March 25:  U.s pancreas portions normal in size. Liver heterogeneous in echogenicity. No liver lesions. Liver vessels patent. Gallbladder is normal.  Common duct 3mm. Right kidney 10.6cm.  Spleen 11.8cm. Plan to redo in 6m.   March 4 labs show glucose 93 BUN of 12 creatinine slightly up at 1.21 from 1.02 before.  That could potentially be from your hydrational state vs medicine role. You prior have not had issues on the medicine. Suspect due to your work that you may have been running a little bit dehydrated that day it would appear little higher. Need to watch her hydration state to make sure that that stays optimized. Sodium 143 potassium 4.6 chloride 104 CO2 23 albumin 4.8 bilirubin 0.3 alkaline phosphatase 101 AST 21 ALT 15.  Previously AST 22 and ALT 17 so these remain stable. Hep B DNA back to not detected again from previous 70. White cell count 6.7 hemoglobin 16.4 plate count 269 MCV 89.  Neutrophils 4.5 and lymphocytes 1.6. Hep B surface antigen remains positive. Given the work that you are doing not a surprise that you may be running a little bit dehydrated and you need to keep again on top of the hydrational status.   August 13 2021 labs finally back.  White blood cell count 5.9 hemoglobin 16.1 plate count 236 MCV 93.  Neutrophils normal at 3.2.  Glucose 80 BUN of 11 creatinine 1.02.  These are normal.  Sodium slightly up at 145.  Potassium 4.2 chloride 105 CO2 27 albumin 4.4 bilirubin 0.2 alkaline phosphatase 90 AST 22 ALT 17.  Ideal ALT is less than 35 so you are doing well there.  Hep B level is positive at 71 obviously would like to see it undetectable.  Hep B surface antibody has not formed and likely will not unless we keep the virus level undetected and hopefully see the surface antigen clears.  Hep B surface antigen remains positive.  Taking the vemlidy.  July 2 2021 ultrasound back.  They compared it to your prior 2017 scan.  Body of pancreas was unremarkable but portions of it were not well seen due to gas. Liver remains homogeneous with no discrete liver lesion.  Very important to do this every 6 months. Liver vessels patent. Gallbladder unremarkable with no stones seen.  Common bile duct normal at 3 mm. Right kidney 10.3 cm with no hydronephrosis. Spleen 11.9 cm.  Pt had been lost to follow up since 2017 and march 2021.  he is working as a  and he wanda be back on road and staying with ex.  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  Prior to the vemlidy for 2 years were on Viread continuously on this x 5 yrs.   July 2 ultrasound back.  They compared it to your prior 2017 scan.  Body of pancreas was unremarkable but portions of it were not well seen due to gas. Liver remains homogeneous with no discrete liver lesion.  Very important to do this every 6 months. Liver vessels patent. Gallbladder unremarkable with no stones seen.  Common bile duct normal at 3 mm. Right kidney 10.3 cm with no hydronephrosis. Spleen 11.9 cm.  He says he thought he did the labs.  Nov 7 2017: wbc 5.8 hg 15.5 plat 206, glu 119 and little up. Bun 11 cr 1.10, na 144, k 4.2, alb 4.6, tb 0.5, alk 74, ast 24 and alt 19 and hbv dna less than10 and detected.  and amylase and lipase normal. April 2017: wbc 6.8, hg 16.5 plat 224. glu 102, bun 12 cr 1.06, na 142 k 4.4, ast 18 and alt 23 hbv dna not detected. cpk 130 and anymlase and lipase. 73 and 19.   U.s Dec 2017 liver coarsened byt no cirrhotic. Spleen normal.   April 2017 u/s: exam within normal limits. Vessels patent. No gallstones and no liver lesions. Spleen not enlarged.  HBV E antigen is still positive as of that check.   Sept 2015: wbc 7.4, hg 16.3, plat 249. glu 81, cr 1.27, na 144 k 4.3, alk 81 and ast and alt 26 and 24. HBV pcr not detected.   June 30 2015: wbc 6.0, hg 15.8, plat 226, glu 83, bun 11, cr 1.27, ast 27 and alt 22. tb 0.5. HBV less than20 not detected. afp 2.7. eag pos and eab neg.   Plan: 1. Pt did u.s pending and doing the labs, 2. Gave 3 weeks of vemlidy samples and start the appeal for this. 3.  Plan for labs in 1m and 3m. 4.  telemed in 3m. 5. Pt will call me if any issues.   Duration of visit 45 minutes total with 10 min of prep reviewing older info and then 35 min for the face to face visit with greater than 50% of time spent reviewing chart and events with pt.

## 2023-07-28 ENCOUNTER — TELEPHONE ENCOUNTER (OUTPATIENT)
Dept: URBAN - METROPOLITAN AREA CLINIC 86 | Facility: CLINIC | Age: 53
End: 2023-07-28

## 2023-07-28 NOTE — HPI-TODAY'S VISIT:
Dear Dev Herculse, July 27 ultrasound shows partially seen pancreas unremarkable. Liver was homogeneous/even in echotexture and normal in echogenicity with no focal mass. No gallstones were seen and gallbladder was thin-walled. Common bile duct normal at 2 mm. Right kidney 11.7 cm with no stones. Spleen upper normal 12.7 cm.  Liver and spleen vessels patent as expected. In summary no overt evidence of cirrhosis or focal mass and patent vessels. Still awaiting labs. I am glad that you are back on your medicines and we need to make sure that you stay on that. Please do labs as we discussed. Called pt and he was updated on this. Dr. Clark

## 2023-07-31 ENCOUNTER — TELEPHONE ENCOUNTER (OUTPATIENT)
Dept: URBAN - METROPOLITAN AREA CLINIC 86 | Facility: CLINIC | Age: 53
End: 2023-07-31

## 2023-07-31 LAB
A/G RATIO: 1.7
ABSOLUTE BASOPHILS: 72
ABSOLUTE EOSINOPHILS: 72
ABSOLUTE LYMPHOCYTES: 1932
ABSOLUTE MONOCYTES: 450
ABSOLUTE NEUTROPHILS: 3474
ALBUMIN: 4.7
ALKALINE PHOSPHATASE: 79
ALT (SGPT): 22
AST (SGOT): 22
BASOPHILS: 1.2
BILIRUBIN, TOTAL: 0.7
BUN/CREATININE RATIO: (no result)
BUN: 17
CALCIUM: 9.9
CARBON DIOXIDE, TOTAL: 28
CHLORIDE: 104
CREATININE: 1.18
EGFR: 73
EOSINOPHILS: 1.2
GLOBULIN, TOTAL: 2.8
GLUCOSE: 102
HBV LOG10: 2.19
HEMATOCRIT: 49.7
HEMOGLOBIN: 17.1
HEP BE AB: REACTIVE
HEP BE AG: (no result)
HEPATITIS B SURFACE ANTIGEN: REACTIVE
HEPATITIS B VIRUS DNA: 156
HEPATITIS B VIRUS DNA: 2.19
LYMPHOCYTES: 32.2
MCH: 30.5
MCHC: 34.4
MCV: 88.8
MONOCYTES: 7.5
MPV: 11.6
NEUTROPHILS: 57.9
PLATELET COUNT: 247
POTASSIUM: 4.4
PROTEIN, TOTAL: 7.5
RDW: 12
RED BLOOD CELL COUNT: 5.6
SODIUM: 140
WHITE BLOOD CELL COUNT: 6

## 2023-07-31 NOTE — HPI-TODAY'S VISIT:
Dear Dev Hercules, July 27 labs showed us that your HBV E antigen was negative and your HBV E antibody is positive.  This is called the Precore mutant state and it is a middle stage that you can see where the virus tends to replicate less then it used to before you became a Precore mutant (hence why level not so high even off meds).  It is part of the path that many feel towards eventual clearance needs to be seen. Glucose was slightly up at 102 but she may not have been fasting.  BUN was 17 creatinine slightly higher at 1.18 from 1.11 prior but prior to that had been 1.17 so not too far from usual.  Sodium 140 potassium 4.4 calcium 9.9 albumin 4.7 bilirubin 0.7 alk phos 79 and curiously her AST was normal at 22 and ALT was 22. Your hep B DNA level was only 156 despite being off the medicine which is impressive considering off meds for a bit.  Previously your hep B DNA in September had been less than 10 but detected but in May of last year it had been completely not detected.  We definitely want it to stay not detected for the best chance to complete the path to clear the hep B possibly. WBC normal at 6 hemoglobin 17 point plate count 247 MCV 88.8 neutrophils 3474 and lymphocytes 1932. Hep B surface antigen unfortunately remains positive and you need to stay on medicine and being suppressed on the virus to have the best chance for that to hopefully clear. I would repeat your labs again after you have been back on the medicines now for about a month and if that stable we will slow it down to every 3 months. Dr. Clark

## 2023-08-24 ENCOUNTER — LAB OUTSIDE AN ENCOUNTER (OUTPATIENT)
Dept: URBAN - METROPOLITAN AREA CLINIC 86 | Facility: CLINIC | Age: 53
End: 2023-08-24

## 2023-09-11 ENCOUNTER — TELEPHONE ENCOUNTER (OUTPATIENT)
Dept: URBAN - METROPOLITAN AREA CLINIC 86 | Facility: CLINIC | Age: 53
End: 2023-09-11

## 2023-09-11 LAB
A/G RATIO: 1.7
ABSOLUTE BASOPHILS: 61
ABSOLUTE EOSINOPHILS: 72
ABSOLUTE LYMPHOCYTES: 1947
ABSOLUTE MONOCYTES: 407
ABSOLUTE NEUTROPHILS: 3014
ALBUMIN: 4.7
ALKALINE PHOSPHATASE: 75
ALT (SGPT): 22
AST (SGOT): 19
BASOPHILS: 1.1
BILIRUBIN, TOTAL: 0.6
BUN/CREATININE RATIO: (no result)
BUN: 17
CALCIUM: 9.9
CARBON DIOXIDE, TOTAL: 31
CHLORIDE: 102
CREATININE: 1.2
EGFR: 72
EOSINOPHILS: 1.3
GLOBULIN, TOTAL: 2.7
GLUCOSE: 114
HBV LOG10: NOT DETECTED
HEMATOCRIT: 50
HEMOGLOBIN: 16.6
HEPATITIS B VIRUS DNA: NOT DETECTED
HEPATITIS B VIRUS DNA: NOT DETECTED
LYMPHOCYTES: 35.4
MCH: 30.2
MCHC: 33.2
MCV: 90.9
MONOCYTES: 7.4
MPV: 10.9
NEUTROPHILS: 54.8
PLATELET COUNT: 212
POTASSIUM: 4.3
PROTEIN, TOTAL: 7.4
RDW: 12.8
RED BLOOD CELL COUNT: 5.5
SODIUM: 141
WHITE BLOOD CELL COUNT: 5.5

## 2023-09-11 NOTE — HPI-TODAY'S VISIT:
Dear Dev Hercules,   Sept 8: glucose 114 elevated and bun 17 and cr 1.20 and na 141 and k 4.3 and cl 102 and co2 31 and ca 9.9 and alb 4.7 and tb 0.6 and alk 75 and ast 19 and alt 22 (prior ast 22 and alt 22). Wbc 5.5 and hg 16.6 and hct 50 and mcv 90.9, platelets 212. Neutrophils 3014 and lymphs 1947. HBV not detected and prior 156.  Dr Clark

## 2023-10-27 ENCOUNTER — LAB OUTSIDE AN ENCOUNTER (OUTPATIENT)
Dept: URBAN - METROPOLITAN AREA CLINIC 86 | Facility: CLINIC | Age: 53
End: 2023-10-27

## 2023-11-03 ENCOUNTER — OFFICE VISIT (OUTPATIENT)
Dept: URBAN - METROPOLITAN AREA CLINIC 86 | Facility: CLINIC | Age: 53
End: 2023-11-03

## 2023-11-03 ENCOUNTER — OFFICE VISIT (OUTPATIENT)
Dept: URBAN - METROPOLITAN AREA TELEHEALTH 2 | Facility: TELEHEALTH | Age: 53
End: 2023-11-03
Payer: COMMERCIAL

## 2023-11-03 VITALS — WEIGHT: 226 LBS | HEIGHT: 71 IN | BODY MASS INDEX: 31.64 KG/M2

## 2023-11-03 DIAGNOSIS — E55.9 VITAMIN D DEFICIENCY: ICD-10-CM

## 2023-11-03 DIAGNOSIS — B18.1 CHRONIC HEPATITIS B: ICD-10-CM

## 2023-11-03 DIAGNOSIS — E78.5 HYPERLIPIDEMIA: ICD-10-CM

## 2023-11-03 DIAGNOSIS — Z12.11 SCREENING COLONOSCOPY: ICD-10-CM

## 2023-11-03 PROCEDURE — 99214 OFFICE O/P EST MOD 30 MIN: CPT

## 2023-11-03 RX ORDER — AMLODIPINE BESYLATE 10 MG/1
1 TABLET TABLET ORAL ONCE A DAY
Status: ACTIVE | COMMUNITY

## 2023-11-03 RX ORDER — TENOFOVIR ALAFENAMIDE 25 MG/1
TAKE 1 TABLET BY MOUTH ONCE DAILY WITH FOOD TABLET ORAL
Qty: 90 | Refills: 1

## 2023-11-03 RX ORDER — TENOFOVIR ALAFENAMIDE 25 MG/1
TAKE 1 TABLET BY MOUTH ONCE DAILY WITH FOOD TABLET ORAL
Qty: 30 | Refills: 5 | Status: ACTIVE | COMMUNITY

## 2023-11-03 RX ORDER — ATORVASTATIN CALCIUM 10 MG/1
1 TABLET TABLET, FILM COATED ORAL ONCE A DAY
Status: ACTIVE | COMMUNITY

## 2023-11-03 RX ORDER — TENOFOVIR ALAFENAMIDE 25 MG/1
TAKE 1 TABLET BY MOUTH ONCE DAILY WITH FOOD TABLET ORAL
Qty: 30 | Refills: 5

## 2023-11-03 NOTE — HPI-TODAY'S VISIT:
Pt is a 53 year old /Black male, after a previous visit on July 2023 for an evaluation for hepatitis B on treatment evaluation.   Gave 3 weeks of Vemlidy to get him back on this asap and start appeal process for him to stay back on it.  Sept 8: glucose 114 elevated and bun 17 and cr 1.20 and na 141 and k 4.3 and cl 102 and co2 31 and ca 9.9 and alb 4.7 and tb 0.6 and alk 75 and ast 19 and alt 22 (prior ast 22 and alt 22). Wbc 5.5 and hg 16.6 and hct 50 and mcv 90.9, platelets 212. Neutrophils 3014 and lymphs 1947. HBV not detected and prior 156.  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 This is called the Precore mutant state and it is a middle stage that you can see where the virus tends to replicate less then it used to before you became a Precore mutant (hence why level not so high even off meds). It is part of the path that many feel towards eventual clearance needs to be seen. Glucose was slightly up at 102 but she may not have been fasting. BUN was 17 creatinine slightly higher at 1.18 from 1.11 prior but prior to that had been 1.17 so not too far from usual. Sodium 140 potassium 4.4 calcium 9.9 albumin 4.7 bilirubin 0.7 alk phos 79 and curiously her AST was normal at 22 and ALT was 22. Your hep B DNA level was only 156 despite being off the medicine which is impressive considering off meds for a bit. Previously your hep B DNA in September had been less than 10 but detected but in May of last year it had been completely not detected. We definitely want it to stay not detected for the best chance to complete the path to clear the hep B possibly. WBC normal at 6 hemoglobin 17 point plate count 247 MCV 88.8 neutrophils 3474 and lymphocytes 1932. Hep B surface antigen unfortunately remains positive and you need to stay on medicine and being suppressed on the virus to have the best chance for that to hopefully clear. I would repeat your labs again after you have been back on the medicines now for about a month and if that stable we will slow it down to every 3 months.  July 27 ultrasound shows partially seen pancreas unremarkable. Liver was homogeneous/even in echotexture and normal in echogenicity with no focal mass. No gallstones were seen and gallbladder was thin-walled. Common bile duct normal at 2 mm. Right kidney 11.7 cm with no stones. Spleen upper normal 12.7 cm. Liver and spleen vessels patent as expected. In summary no overt evidence of cirrhosis or focal mass and patent vessels. Still awaiting labs. I am glad that you are back on your medicines and we need to make sure that you stay on that. Please do labs as we discussed. Called pt and he was updated on this.   Sept 9 2022: hepatitis delta negative and good to know this. Your hep B surface antigen remains positive. Last time the hbv surface antigen was a little bit lower because they had to run it twice to confirm that it was positive.  We just need to watch that and make sure there is remains moving in the right direction. Hep B DNA remains low at less than 10 but the last 2 times it has been less than 10 but not detected. Any lapses in the dosing? Sugar slightly up at 102 BUN of 18 creatinine 1.11 sodium 143 potassium 4.6 calcium 10 albumin 4.8 bilirubin 0.4 alkaline phosphatase 74.  AST up to 51 and ALT 68 which is unusual for you.  Previously her AST was 23 and 20 cardiac 2115. White cell count 6.4 hemoglobin 16.8 plate count 223 MCV 91.8 neutrophils 4026 lymphocytes 1658. He had been off meds due to travel for his job and redo needed in 2 weeks to be sure that he is settling down. He is on the road and will do labs on the road. I will ask Rubia to see if send the labs electronically anywhere he can do a lab.  September 9 2022 ultrasound shows liver to have normal echogenicity and no lesions. Gallbladder with no stones and no thickening. Common bile duct normal at 3.9 mm. Pancreas normal. Right kidney 11 cm.  Spleen 11 cm. Liver vessels patent.   Overall the liver had normal echogenicity and no suspicious lesions.  May 27: wbc 7.4 and hg 17.9 and hct 53.4 and and prior hg 16.4 so wonder if you were running a little dry that day as blood seems concentrated. MCV 90. Platelets 235 normal. Neutrophils 4.7 and lymphs 2.0. Glucose 87 and bun 14 and cr 1.17 and na 144 and k 4.3 and cl 103 and co2 26 and ca 10.1 and alb 4.9 and tb 0.7 and alk 91 and ast 23 and alt 20. Prior ast 21 and alt 15. HBV dna not detected. So medicine working well. Inr 1.0. May want to redo the cbc when well hydrated.   March 25:  U.s pancreas portions normal in size. Liver heterogeneous in echogenicity. No liver lesions. Liver vessels patent. Gallbladder is normal.  Common duct 3mm. Right kidney 10.6cm.  Spleen 11.8cm. Plan to redo in 6m.   March 4 labs show glucose 93 BUN of 12 creatinine slightly up at 1.21 from 1.02 before.  That could potentially be from your hydrational state vs medicine role. You prior have not had issues on the medicine. Suspect due to your work that you may have been running a little bit dehydrated that day it would appear little higher. Need to watch her hydration state to make sure that that stays optimized. Sodium 143 potassium 4.6 chloride 104 CO2 23 albumin 4.8 bilirubin 0.3 alkaline phosphatase 101 AST 21 ALT 15.  Previously AST 22 and ALT 17 so these remain stable. Hep B DNA back to not detected again from previous 70. White cell count 6.7 hemoglobin 16.4 plate count 269 MCV 89.  Neutrophils 4.5 and lymphocytes 1.6. Hep B surface antigen remains positive. Given the work that you are doing not a surprise that you may be running a little bit dehydrated and you need to keep again on top of the hydrational status.   August 13 2021 labs finally back.  White blood cell count 5.9 hemoglobin 16.1 plate count 236 MCV 93.  Neutrophils normal at 3.2.  Glucose 80 BUN of 11 creatinine 1.02.  These are normal.  Sodium slightly up at 145.  Potassium 4.2 chloride 105 CO2 27 albumin 4.4 bilirubin 0.2 alkaline phosphatase 90 AST 22 ALT 17.  Ideal ALT is less than 35 so you are doing well there.  Hep B level is positive at 71 obviously would like to see it undetectable.  Hep B surface antibody has not formed and likely will not unless we keep the virus level undetected and hopefully see the surface antigen clears.  Hep B surface antigen remains positive.  Taking the vemlidy.  July 2 2021 ultrasound back.  They compared it to your prior 2017 scan.  Body of pancreas was unremarkable but portions of it were not well seen due to gas. Liver remains homogeneous with no discrete liver lesion.  Very important to do this every 6 months. Liver vessels patent. Gallbladder unremarkable with no stones seen.  Common bile duct normal at 3 mm. Right kidney 10.3 cm with no hydronephrosis. Spleen 11.9 cm.  Pt had been lost to follow up since 2017 and march 2021.  he is working as a  and he wanda be back on road and staying with ex.  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  Prior to the vemlidy for 2 years were on Viread continuously on this x 5 yrs.   July 2 ultrasound back.  They compared it to your prior 2017 scan.  Body of pancreas was unremarkable but portions of it were not well seen due to gas. Liver remains homogeneous with no discrete liver lesion.  Very important to do this every 6 months. Liver vessels patent. Gallbladder unremarkable with no stones seen.  Common bile duct normal at 3 mm. Right kidney 10.3 cm with no hydronephrosis. Spleen 11.9 cm.  He says he thought he did the labs.  Nov 7 2017: wbc 5.8 hg 15.5 plat 206, glu 119 and little up. Bun 11 cr 1.10, na 144, k 4.2, alb 4.6, tb 0.5, alk 74, ast 24 and alt 19 and hbv dna less than10 and detected.  and amylase and lipase normal. April 2017: wbc 6.8, hg 16.5 plat 224. glu 102, bun 12 cr 1.06, na 142 k 4.4, ast 18 and alt 23 hbv dna not detected. cpk 130 and anymlase and lipase. 73 and 19.   U.s Dec 2017 liver coarsened byt no cirrhotic. Spleen normal.   April 2017 u/s: exam within normal limits. Vessels patent. No gallstones and no liver lesions. Spleen not enlarged.  HBV E antigen is still positive as of that check.   Sept 2015: wbc 7.4, hg 16.3, plat 249. glu 81, cr 1.27, na 144 k 4.3, alk 81 and ast and alt 26 and 24. HBV pcr not detected.   June 30 2015: wbc 6.0, hg 15.8, plat 226, glu 83, bun 11, cr 1.27, ast 27 and alt 22. tb 0.5. HBV less than20 not detected. afp 2.7. eag pos and eab neg.   Plan: 1. Pt will do the labs in dec u.s and labs then and stay on meds, 2, Pt will call if issues. 3. Pt will call if issues.   Duration of visit  32 minutes total with 10 min of prep reviewing older info and then 22 min from 200 to 222 pm by clock as sytarted jeanne and then to dox video due to internet physicians with  time spent reviewing chart and events with pt.

## 2023-11-03 NOTE — HPI-TODAY'S VISIT:
Pt is a 53 year old /Black male, after a previous visit on July 2023 for an evaluation for hepatitis B on treatment evaluation.   Did u.s and will do labs,  Had lost insurance and off the meds for 3m. Gave 3 weeks of Vemlidy to get him back on this asap and start appeal process for him to stay back on it.  Dear Dev Hercules,  Sept 8: glucose 114 elevated and bun 17 and cr 1.20 and na 141 and k 4.3 and cl 102 and co2 31 and ca 9.9 and alb 4.7 and tb 0.6 and alk 75 and ast 19 and alt 22 (prior ast 22 and alt 22). Wbc 5.5 and hg 16.6 and hct 50 and mcv 90.9, platelets 212. Neutrophils 3014 and lymphs 1947. HBV not detected and prior 156.  Dr Clark Dear Dev Hercules, July 27 labs showed us that your HBV E antigen was negative and your HBV E antibody is positive. This is called the Precore mutant state and it is a middle stage that you can see where the virus tends to replicate less then it used to before you became a Precore mutant (hence why level not so high even off meds). It is part of the path that many feel towards eventual clearance needs to be seen. Glucose was slightly up at 102 but she may not have been fasting. BUN was 17 creatinine slightly higher at 1.18 from 1.11 prior but prior to that had been 1.17 so not too far from usual. Sodium 140 potassium 4.4 calcium 9.9 albumin 4.7 bilirubin 0.7 alk phos 79 and curiously her AST was normal at 22 and ALT was 22. Your hep B DNA level was only 156 despite being off the medicine which is impressive considering off meds for a bit. Previously your hep B DNA in September had been less than 10 but detected but in May of last year it had been completely not detected. We definitely want it to stay not detected for the best chance to complete the path to clear the hep B possibly. WBC normal at 6 hemoglobin 17 point plate count 247 MCV 88.8 neutrophils 3474 and lymphocytes 1932. Hep B surface antigen unfortunately remains positive and you need to stay on medicine and being suppressed on the virus to have the best chance for that to hopefully clear. I would repeat your labs again after you have been back on the medicines now for about a month and if that stable we will slow it down to every 3 months. Dr. Clark Dear Dev Hercules, July 27 ultrasound shows partially seen pancreas unremarkable. Liver was homogeneous/even in echotexture and normal in echogenicity with no focal mass. No gallstones were seen and gallbladder was thin-walled. Common bile duct normal at 2 mm. Right kidney 11.7 cm with no stones. Spleen upper normal 12.7 cm. Liver and spleen vessels patent as expected. In summary no overt evidence of cirrhosis or focal mass and patent vessels. Still awaiting labs. I am glad that you are back on your medicines and we need to make sure that you stay on that. Please do labs as we discussed. Called pt and he was updated on this. Dr. Clark  Sept 9 2022: hepatitis delta negative and good to know this. Your hep B surface antigen remains positive. Last time the hbv surface antigen was a little bit lower because they had to run it twice to confirm that it was positive.  We just need to watch that and make sure there is remains moving in the right direction. Hep B DNA remains low at less than 10 but the last 2 times it has been less than 10 but not detected. Any lapses in the dosing? Sugar slightly up at 102 BUN of 18 creatinine 1.11 sodium 143 potassium 4.6 calcium 10 albumin 4.8 bilirubin 0.4 alkaline phosphatase 74.  AST up to 51 and ALT 68 which is unusual for you.  Previously her AST was 23 and 20 cardiac 2115. White cell count 6.4 hemoglobin 16.8 plate count 223 MCV 91.8 neutrophils 4026 lymphocytes 1658. He had been off meds due to travel for his job and redo needed in 2 weeks to be sure that he is settling down. He is on the road and will do labs on the road. I will ask Rubia to see if send the labs electronically anywhere he can do a lab.  September 9 2022 ultrasound shows liver to have normal echogenicity and no lesions. Gallbladder with no stones and no thickening. Common bile duct normal at 3.9 mm. Pancreas normal. Right kidney 11 cm.  Spleen 11 cm. Liver vessels patent.   Overall the liver had normal echogenicity and no suspicious lesions.  May 27: wbc 7.4 and hg 17.9 and hct 53.4 and and prior hg 16.4 so wonder if you were running a little dry that day as blood seems concentrated. MCV 90. Platelets 235 normal. Neutrophils 4.7 and lymphs 2.0. Glucose 87 and bun 14 and cr 1.17 and na 144 and k 4.3 and cl 103 and co2 26 and ca 10.1 and alb 4.9 and tb 0.7 and alk 91 and ast 23 and alt 20. Prior ast 21 and alt 15. HBV dna not detected. So medicine working well. Inr 1.0. May want to redo the cbc when well hydrated.   Getting meds ok.  March 25:  U.s pancreas portions normal in size. Liver heterogeneous in echogenicity. No liver lesions. Liver vessels patent. Gallbladder is normal.  Common duct 3mm. Right kidney 10.6cm.  Spleen 11.8cm. Plan to redo in 6m.   March 4 labs show glucose 93 BUN of 12 creatinine slightly up at 1.21 from 1.02 before.  That could potentially be from your hydrational state vs medicine role. You prior have not had issues on the medicine. Suspect due to your work that you may have been running a little bit dehydrated that day it would appear little higher. Need to watch her hydration state to make sure that that stays optimized. Sodium 143 potassium 4.6 chloride 104 CO2 23 albumin 4.8 bilirubin 0.3 alkaline phosphatase 101 AST 21 ALT 15.  Previously AST 22 and ALT 17 so these remain stable. Hep B DNA back to not detected again from previous 70. White cell count 6.7 hemoglobin 16.4 plate count 269 MCV 89.  Neutrophils 4.5 and lymphocytes 1.6. Hep B surface antigen remains positive. Given the work that you are doing not a surprise that you may be running a little bit dehydrated and you need to keep again on top of the hydrational status.   August 13 2021 labs finally back.  White blood cell count 5.9 hemoglobin 16.1 plate count 236 MCV 93.  Neutrophils normal at 3.2.  Glucose 80 BUN of 11 creatinine 1.02.  These are normal.  Sodium slightly up at 145.  Potassium 4.2 chloride 105 CO2 27 albumin 4.4 bilirubin 0.2 alkaline phosphatase 90 AST 22 ALT 17.  Ideal ALT is less than 35 so you are doing well there.  Hep B level is positive at 71 obviously would like to see it undetectable.  Hep B surface antibody has not formed and likely will not unless we keep the virus level undetected and hopefully see the surface antigen clears.  Hep B surface antigen remains positive.  Taking the vemlidy.  July 2 2021 ultrasound back.  They compared it to your prior 2017 scan.  Body of pancreas was unremarkable but portions of it were not well seen due to gas. Liver remains homogeneous with no discrete liver lesion.  Very important to do this every 6 months. Liver vessels patent. Gallbladder unremarkable with no stones seen.  Common bile duct normal at 3 mm. Right kidney 10.3 cm with no hydronephrosis. Spleen 11.9 cm.  Pt had been lost to follow up since 2017 and march 2021.  he is working as a  and he wanda be back on road and staying with ex.  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  Prior to the vemlidy for 2 years were on Viread continuously on this x 5 yrs.   July 2 ultrasound back.  They compared it to your prior 2017 scan.  Body of pancreas was unremarkable but portions of it were not well seen due to gas. Liver remains homogeneous with no discrete liver lesion.  Very important to do this every 6 months. Liver vessels patent. Gallbladder unremarkable with no stones seen.  Common bile duct normal at 3 mm. Right kidney 10.3 cm with no hydronephrosis. Spleen 11.9 cm.  He says he thought he did the labs.  Nov 7 2017: wbc 5.8 hg 15.5 plat 206, glu 119 and little up. Bun 11 cr 1.10, na 144, k 4.2, alb 4.6, tb 0.5, alk 74, ast 24 and alt 19 and hbv dna less than10 and detected.  and amylase and lipase normal. April 2017: wbc 6.8, hg 16.5 plat 224. glu 102, bun 12 cr 1.06, na 142 k 4.4, ast 18 and alt 23 hbv dna not detected. cpk 130 and anymlase and lipase. 73 and 19.   U.s Dec 2017 liver coarsened byt no cirrhotic. Spleen normal.   April 2017 u/s: exam within normal limits. Vessels patent. No gallstones and no liver lesions. Spleen not enlarged.  HBV E antigen is still positive as of that check.   Sept 2015: wbc 7.4, hg 16.3, plat 249. glu 81, cr 1.27, na 144 k 4.3, alk 81 and ast and alt 26 and 24. HBV pcr not detected.   June 30 2015: wbc 6.0, hg 15.8, plat 226, glu 83, bun 11, cr 1.27, ast 27 and alt 22. tb 0.5. HBV less than20 not detected. afp 2.7. eag pos and eab neg.   Plan: 1. Pt did u.s pending and doing the labs, 2. Gave 3 weeks of vemlidy samples and start the appeal for this. 3.  Plan for labs in 1m and 3m. 4.  telemed in 3m. 5. Pt will call me if any issues.   Duration of visit  minutes total with 10 min of prep reviewing older info and then 35 min for the face to face visit with greater than 50% of time spent reviewing chart and events with pt.

## 2023-12-01 ENCOUNTER — OFFICE VISIT (OUTPATIENT)
Dept: URBAN - METROPOLITAN AREA CLINIC 91 | Facility: CLINIC | Age: 53
End: 2023-12-01

## 2023-12-01 ENCOUNTER — LAB OUTSIDE AN ENCOUNTER (OUTPATIENT)
Dept: URBAN - METROPOLITAN AREA TELEHEALTH 2 | Facility: TELEHEALTH | Age: 53
End: 2023-12-01

## 2023-12-22 ENCOUNTER — TELEPHONE ENCOUNTER (OUTPATIENT)
Dept: URBAN - METROPOLITAN AREA CLINIC 86 | Facility: CLINIC | Age: 53
End: 2023-12-22

## 2023-12-22 LAB
A/G RATIO: 1.6
ABSOLUTE BASOPHILS: 51
ABSOLUTE EOSINOPHILS: 108
ABSOLUTE LYMPHOCYTES: 2098
ABSOLUTE MONOCYTES: 610
ABSOLUTE NEUTROPHILS: 2833
ALBUMIN: 4.7
ALKALINE PHOSPHATASE: 70
ALT (SGPT): 23
AST (SGOT): 21
BASOPHILS: 0.9
BILIRUBIN, TOTAL: 0.7
BUN/CREATININE RATIO: (no result)
BUN: 13
CALCIUM: 9.9
CARBON DIOXIDE, TOTAL: 30
CHLORIDE: 104
CREATININE: 1.08
EGFR: 81
EOSINOPHILS: 1.9
GLOBULIN, TOTAL: 3
GLUCOSE: 95
HBV LOG10: NOT DETECTED
HEMATOCRIT: 47.6
HEMOGLOBIN: 16.4
HEPATITIS B VIRUS DNA: NOT DETECTED
LYMPHOCYTES: 36.8
MCH: 30.7
MCHC: 34.5
MCV: 89
MONOCYTES: 10.7
MPV: 11.5
NEUTROPHILS: 49.7
PLATELET COUNT: 228
POTASSIUM: 4.1
PROTEIN, TOTAL: 7.7
RDW: 12.4
RED BLOOD CELL COUNT: 5.35
SODIUM: 142
WHITE BLOOD CELL COUNT: 5.7

## 2023-12-22 NOTE — HPI-TODAY'S VISIT:
Dear Dev Hercules,  December 21 labs show hep B DNA remains not detected as it was back in September and back in December and had been positive at 156. Glucose was 95 BUN 13 creatinine 1.08 which is actually lower than in September 1 0.2.  Clearly doing well with the current medicine. Sodium 142 potassium 4.1 calcium 9.9 albumin 4.7 bilirubin 0.7 alk phos down to 70 and AST 21 ALT 23 with ideal ALT less than 35. WBC 5.7 hemoglobin 16.4 platelet 228 MCV 89 with normal neutrophils and lymphocytes. In summary, liver labs are doing well and renal function is stable.  Hep B suppression effort is going very well for you. I see you have an u.s pending Dec 28. Wishing you and your family a happy holiday season and a happy new year! Dr. Clark  Spoke with pt and he had covid 19 recently but did well with it.

## 2023-12-28 ENCOUNTER — OFFICE VISIT (OUTPATIENT)
Dept: URBAN - METROPOLITAN AREA CLINIC 91 | Facility: CLINIC | Age: 53
End: 2023-12-28
Payer: COMMERCIAL

## 2023-12-28 DIAGNOSIS — B18.1 CHRONIC HEPATITIS B: ICD-10-CM

## 2023-12-28 DIAGNOSIS — K76.0 FATTY LIVER: ICD-10-CM

## 2023-12-28 PROCEDURE — 93975 VASCULAR STUDY: CPT

## 2023-12-28 PROCEDURE — 76705 ECHO EXAM OF ABDOMEN: CPT

## 2023-12-29 ENCOUNTER — TELEPHONE ENCOUNTER (OUTPATIENT)
Dept: URBAN - METROPOLITAN AREA CLINIC 86 | Facility: CLINIC | Age: 53
End: 2023-12-29

## 2023-12-29 NOTE — HPI-TODAY'S VISIT:
Dear Dev Hercules,  December 28 ultrasound shows liver echogenicity is increased but with no focal lesions.  Common bile duct was 4 mm which is normal.  Gallbladder appeared normal. Visualized pancreas portions appeared unremarkable. Liver vessels patent as expected.  Splenic vessels patent also.  Spleen was 12.1 cm. Right kidney was 12.3 cm with no hydronephrosis. Overall, they felt the liver was mildly echogenic which could represent a mildly fatty liver with no lesions.  Patent vessels were seen. Need to continue to work on a low-fat diet if possible given your heavy traveling history for your work.  Sadly this puts you at risk to not be able to eat as healthfully as much as you would if you were here local. Will review with you further next week. Dr. Clark

## 2024-01-02 ENCOUNTER — TELEPHONE ENCOUNTER (OUTPATIENT)
Dept: URBAN - METROPOLITAN AREA CLINIC 92 | Facility: CLINIC | Age: 54
End: 2024-01-02

## 2024-01-02 ENCOUNTER — OFFICE VISIT (OUTPATIENT)
Dept: URBAN - METROPOLITAN AREA TELEHEALTH 2 | Facility: TELEHEALTH | Age: 54
End: 2024-01-02
Payer: COMMERCIAL

## 2024-01-02 VITALS — WEIGHT: 226 LBS | HEIGHT: 71 IN | BODY MASS INDEX: 31.64 KG/M2

## 2024-01-02 DIAGNOSIS — E78.5 HYPERLIPIDEMIA: ICD-10-CM

## 2024-01-02 DIAGNOSIS — B18.1 CHRONIC HEPATITIS B: ICD-10-CM

## 2024-01-02 DIAGNOSIS — I10 ELEVATED BLOOD PRESSURE: ICD-10-CM

## 2024-01-02 DIAGNOSIS — Z79.899 HIGH RISK MEDICATION USE: ICD-10-CM

## 2024-01-02 PROCEDURE — 99214 OFFICE O/P EST MOD 30 MIN: CPT

## 2024-01-02 RX ORDER — TENOFOVIR ALAFENAMIDE 25 MG/1
TAKE 1 TABLET BY MOUTH ONCE DAILY WITH FOOD TABLET ORAL
Qty: 90 | Refills: 1 | Status: ACTIVE | COMMUNITY

## 2024-01-02 RX ORDER — TENOFOVIR ALAFENAMIDE 25 MG/1
TAKE 1 TABLET BY MOUTH ONCE DAILY WITH FOOD TABLET ORAL
Qty: 90 | Refills: 1

## 2024-01-02 RX ORDER — AMLODIPINE BESYLATE 10 MG/1
1 TABLET TABLET ORAL ONCE A DAY
Status: ACTIVE | COMMUNITY

## 2024-01-02 RX ORDER — ATORVASTATIN CALCIUM 10 MG/1
1 TABLET TABLET, FILM COATED ORAL ONCE A DAY
Status: ACTIVE | COMMUNITY

## 2024-01-02 NOTE — HPI-TODAY'S VISIT:
Pt is a 53 year old /Black male, after a previous visit on Nov 2023 for an evaluation for hepatitis B on treatment evaluation.   December 28 ultrasound shows liver echogenicity is increased but with no focal lesions. Common bile duct was 4 mm which is normal. Gallbladder appeared normal. Visualized pancreas portions appeared unremarkable. Liver vessels patent as expected. Splenic vessels patent also. Spleen was 12.1 cm. Right kidney was 12.3 cm with no hydronephrosis. Overall, they felt the liver was mildly echogenic which could represent a mildly fatty liver with no lesions. Patent vessels were seen. Need to continue to work on a low-fat diet if possible given your heavy traveling history for your work. Sadly this puts you at risk to not be able to eat as healthfully as much as you would if you were here local.   December 21 labs show hep B DNA remains not detected as it was back in September and back in December and had been positive at 156. Glucose was 95 BUN 13 creatinine 1.08 which is actually lower than in September. Clearly doing well with the current medicine. Sodium 142 potassium 4.1 calcium 9.9 albumin 4.7 bilirubin 0.7 alk phos down to 70 and AST 21 ALT 23 with ideal ALT less than 35. WBC 5.7 hemoglobin 16.4 platelet 228 MCV 89 with normal neutrophils and lymphocytes. In summary, liver labs are doing well and renal function is stable. Hep B suppression effort is going very well for you. I see you have an u.s pending Dec 28.  Spoke with pt and he had covid 19 recently but did well with it. Need to see how does post the covid 19.  Gave 3 weeks of Vemlidy to get him back on this asap and start appeal process for him to stay back on it.  Sept 8: glucose 114 elevated and bun 17 and cr 1.20 and na 141 and k 4.3 and cl 102 and co2 31 and ca 9.9 and alb 4.7 and tb 0.6 and alk 75 and ast 19 and alt 22 (prior ast 22 and alt 22). Wbc 5.5 and hg 16.6 and hct 50 and mcv 90.9, platelets 212. Neutrophils 3014 and lymphs 1947. HBV not detected and prior 156.  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 This is called the Precore mutant state and it is a middle stage that you can see where the virus tends to replicate less then it used to before you became a Precore mutant (hence why level not so high even off meds). It is part of the path that many feel towards eventual clearance needs to be seen. Glucose was slightly up at 102 but she may not have been fasting. BUN was 17 creatinine slightly higher at 1.18 from 1.11 prior but prior to that had been 1.17 so not too far from usual. Sodium 140 potassium 4.4 calcium 9.9 albumin 4.7 bilirubin 0.7 alk phos 79 and curiously her AST was normal at 22 and ALT was 22. Your hep B DNA level was only 156 despite being off the medicine which is impressive considering off meds for a bit. Previously your hep B DNA in September had been less than 10 but detected but in May of last year it had been completely not detected. We definitely want it to stay not detected for the best chance to complete the path to clear the hep B possibly. WBC normal at 6 hemoglobin 17 point plate count 247 MCV 88.8 neutrophils 3474 and lymphocytes 1932. Hep B surface antigen unfortunately remains positive and you need to stay on medicine and being suppressed on the virus to have the best chance for that to hopefully clear. I would repeat your labs again after you have been back on the medicines now for about a month and if that stable we will slow it down to every 3 months.  July 27 ultrasound shows partially seen pancreas unremarkable. Liver was homogeneous/even in echotexture and normal in echogenicity with no focal mass. No gallstones were seen and gallbladder was thin-walled. Common bile duct normal at 2 mm. Right kidney 11.7 cm with no stones. Spleen upper normal 12.7 cm. Liver and spleen vessels patent as expected. In summary no overt evidence of cirrhosis or focal mass and patent vessels. Still awaiting labs. I am glad that you are back on your medicines and we need to make sure that you stay on that. Please do labs as we discussed. Called pt and he was updated on this.   Sept 9 2022: hepatitis delta negative and good to know this. Your hep B surface antigen remains positive. Last time the hbv surface antigen was a little bit lower because they had to run it twice to confirm that it was positive.  We just need to watch that and make sure there is remains moving in the right direction. Hep B DNA remains low at less than 10 but the last 2 times it has been less than 10 but not detected. Any lapses in the dosing? Sugar slightly up at 102 BUN of 18 creatinine 1.11 sodium 143 potassium 4.6 calcium 10 albumin 4.8 bilirubin 0.4 alkaline phosphatase 74.  AST up to 51 and ALT 68 which is unusual for you.  Previously her AST was 23 and 20 cardiac 2115. White cell count 6.4 hemoglobin 16.8 plate count 223 MCV 91.8 neutrophils 4026 lymphocytes 1658. He had been off meds due to travel for his job and redo needed in 2 weeks to be sure that he is settling down. He is on the road and will do labs on the road. I will ask Rubia to see if send the labs electronically anywhere he can do a lab.  September 9 2022 ultrasound shows liver to have normal echogenicity and no lesions. Gallbladder with no stones and no thickening. Common bile duct normal at 3.9 mm. Pancreas normal. Right kidney 11 cm.  Spleen 11 cm. Liver vessels patent.   Overall the liver had normal echogenicity and no suspicious lesions.  May 27: wbc 7.4 and hg 17.9 and hct 53.4 and and prior hg 16.4 so wonder if you were running a little dry that day as blood seems concentrated. MCV 90. Platelets 235 normal. Neutrophils 4.7 and lymphs 2.0. Glucose 87 and bun 14 and cr 1.17 and na 144 and k 4.3 and cl 103 and co2 26 and ca 10.1 and alb 4.9 and tb 0.7 and alk 91 and ast 23 and alt 20. Prior ast 21 and alt 15. HBV dna not detected. So medicine working well. Inr 1.0. May want to redo the cbc when well hydrated.   March 25:  U.s pancreas portions normal in size. Liver heterogeneous in echogenicity. No liver lesions. Liver vessels patent. Gallbladder is normal.  Common duct 3mm. Right kidney 10.6cm.  Spleen 11.8cm. Plan to redo in 6m.   March 4 labs show glucose 93 BUN of 12 creatinine slightly up at 1.21 from 1.02 before.  That could potentially be from your hydrational state vs medicine role. You prior have not had issues on the medicine. Suspect due to your work that you may have been running a little bit dehydrated that day it would appear little higher. Need to watch her hydration state to make sure that that stays optimized. Sodium 143 potassium 4.6 chloride 104 CO2 23 albumin 4.8 bilirubin 0.3 alkaline phosphatase 101 AST 21 ALT 15.  Previously AST 22 and ALT 17 so these remain stable. Hep B DNA back to not detected again from previous 70. White cell count 6.7 hemoglobin 16.4 plate count 269 MCV 89.  Neutrophils 4.5 and lymphocytes 1.6. Hep B surface antigen remains positive. Given the work that you are doing not a surprise that you may be running a little bit dehydrated and you need to keep again on top of the hydrational status.   August 13 2021 labs finally back.  White blood cell count 5.9 hemoglobin 16.1 plate count 236 MCV 93.  Neutrophils normal at 3.2.  Glucose 80 BUN of 11 creatinine 1.02.  These are normal.  Sodium slightly up at 145.  Potassium 4.2 chloride 105 CO2 27 albumin 4.4 bilirubin 0.2 alkaline phosphatase 90 AST 22 ALT 17.  Ideal ALT is less than 35 so you are doing well there.  Hep B level is positive at 71 obviously would like to see it undetectable.  Hep B surface antibody has not formed and likely will not unless we keep the virus level undetected and hopefully see the surface antigen clears.  Hep B surface antigen remains positive.  Taking the vemlidy.  July 2 2021 ultrasound back.  They compared it to your prior 2017 scan.  Body of pancreas was unremarkable but portions of it were not well seen due to gas. Liver remains homogeneous with no discrete liver lesion.  Very important to do this every 6 months. Liver vessels patent. Gallbladder unremarkable with no stones seen.  Common bile duct normal at 3 mm. Right kidney 10.3 cm with no hydronephrosis. Spleen 11.9 cm.  Pt had been lost to follow up since 2017 and march 2021.  he is working as a  and he wanda be back on road and staying with ex.  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  Prior to the vemlidy for 2 years were on Viread continuously on this x 5 yrs.   July 2 ultrasound back.  They compared it to your prior 2017 scan.  Body of pancreas was unremarkable but portions of it were not well seen due to gas. Liver remains homogeneous with no discrete liver lesion.  Very important to do this every 6 months. Liver vessels patent. Gallbladder unremarkable with no stones seen.  Common bile duct normal at 3 mm. Right kidney 10.3 cm with no hydronephrosis. Spleen 11.9 cm.  He says he thought he did the labs.  Nov 7 2017: wbc 5.8 hg 15.5 plat 206, glu 119 and little up. Bun 11 cr 1.10, na 144, k 4.2, alb 4.6, tb 0.5, alk 74, ast 24 and alt 19 and hbv dna less than10 and detected.  and amylase and lipase normal. April 2017: wbc 6.8, hg 16.5 plat 224. glu 102, bun 12 cr 1.06, na 142 k 4.4, ast 18 and alt 23 hbv dna not detected. cpk 130 and anymlase and lipase. 73 and 19.   U.s Dec 2017 liver coarsened byt no cirrhotic. Spleen normal.   April 2017 u/s: exam within normal limits. Vessels patent. No gallstones and no liver lesions. Spleen not enlarged.  HBV E antigen is still positive as of that check.   Sept 2015: wbc 7.4, hg 16.3, plat 249. glu 81, cr 1.27, na 144 k 4.3, alk 81 and ast and alt 26 and 24. HBV pcr not detected.   June 30 2015: wbc 6.0, hg 15.8, plat 226, glu 83, bun 11, cr 1.27, ast 27 and alt 22. tb 0.5. HBV less than20 not detected. afp 2.7. eag pos and eab neg.   Plan: 1. Pt will do the u.s June. 2. Pt will do labs in 3 and 6m. 3. Pt will stay on vemlidy.  Duration of visit  30  minutes total with 10 min of prep reviewing older info and then 20 min from 211 to 231 by clock as dox video off due to fluxes by dox video due to internet physicians with  time spent reviewing chart and events with pt.

## 2024-02-01 ENCOUNTER — LAB (OUTPATIENT)
Dept: URBAN - METROPOLITAN AREA TELEHEALTH 2 | Facility: TELEHEALTH | Age: 54
End: 2024-02-01

## 2024-02-09 ENCOUNTER — TELEP (OUTPATIENT)
Dept: URBAN - METROPOLITAN AREA TELEHEALTH 2 | Facility: TELEHEALTH | Age: 54
End: 2024-02-09
Payer: COMMERCIAL

## 2024-02-09 VITALS — WEIGHT: 226 LBS | HEIGHT: 71 IN | BODY MASS INDEX: 31.64 KG/M2

## 2024-02-09 DIAGNOSIS — E66.3 OVERWEIGHT: ICD-10-CM

## 2024-02-09 DIAGNOSIS — E78.5 HYPERLIPIDEMIA: ICD-10-CM

## 2024-02-09 DIAGNOSIS — Z12.11 SCREENING COLONOSCOPY: ICD-10-CM

## 2024-02-09 DIAGNOSIS — Z71.89 VACCINE COUNSELING: ICD-10-CM

## 2024-02-09 DIAGNOSIS — Z79.899 HIGH RISK MEDICATION USE: ICD-10-CM

## 2024-02-09 DIAGNOSIS — E55.9 VITAMIN D DEFICIENCY: ICD-10-CM

## 2024-02-09 DIAGNOSIS — M25.519: ICD-10-CM

## 2024-02-09 DIAGNOSIS — B18.1 CHRONIC HEPATITIS B: ICD-10-CM

## 2024-02-09 DIAGNOSIS — I10 ELEVATED BLOOD PRESSURE: ICD-10-CM

## 2024-02-09 PROCEDURE — 99214 OFFICE O/P EST MOD 30 MIN: CPT

## 2024-02-09 RX ORDER — TENOFOVIR ALAFENAMIDE 25 MG/1
TAKE 1 TABLET BY MOUTH ONCE DAILY WITH FOOD TABLET ORAL
Qty: 90 | Refills: 1 | Status: ACTIVE | COMMUNITY

## 2024-02-09 RX ORDER — AMLODIPINE BESYLATE 10 MG/1
1 TABLET TABLET ORAL ONCE A DAY
Status: ACTIVE | COMMUNITY

## 2024-02-09 RX ORDER — ATORVASTATIN CALCIUM 10 MG/1
1 TABLET TABLET, FILM COATED ORAL ONCE A DAY
Status: ACTIVE | COMMUNITY

## 2024-02-09 RX ORDER — TENOFOVIR ALAFENAMIDE 25 MG/1
TAKE 1 TABLET BY MOUTH ONCE DAILY WITH FOOD TABLET ORAL
Qty: 90 | Refills: 1

## 2024-02-09 NOTE — HPI-TODAY'S VISIT:
Pt is a 53 year old /Black male, after a previous visit on Jan 2024 for an evaluation for hepatitis B on treatment evaluation.   He will do labs today.  December 28 2023 ultrasound shows liver echogenicity is increased but with no focal lesions. Common bile duct was 4 mm which is normal. Gallbladder appeared normal. Visualized pancreas portions appeared unremarkable. Liver vessels patent as expected. Splenic vessels patent also. Spleen was 12.1 cm. Right kidney was 12.3 cm with no hydronephrosis. Overall, they felt the liver was mildly echogenic which could represent a mildly fatty liver with no lesions. Patent vessels were seen. Need to continue to work on a low-fat diet if possible given your heavy traveling history for your work. Sadly this puts you at risk to not be able to eat as healthfully as much as you would if you were here local.   December 21 2023 labs show hep B DNA remains not detected as it was back in September and back in December and had been positive at 156. Glucose was 95 BUN 13 creatinine 1.08 which is actually lower than in September. Clearly doing well with the current medicine. Sodium 142 potassium 4.1 calcium 9.9 albumin 4.7 bilirubin 0.7 alk phos down to 70 and AST 21 ALT 23 with ideal ALT less than 35. WBC 5.7 hemoglobin 16.4 platelet 228 MCV 89 with normal neutrophils and lymphocytes. In summary, liver labs are doing well and renal function is stable. Hep B suppression effort is going very well for you. I see you have an u.s pending Dec 28.  Spoke with pt and he had covid 19 recently but did well with it. Need to see how does post the covid 19.  Gave 3 weeks of Vemlidy to get him back on this asap and start appeal process for him to stay back on it.  Sept 8: glucose 114 elevated and bun 17 and cr 1.20 and na 141 and k 4.3 and cl 102 and co2 31 and ca 9.9 and alb 4.7 and tb 0.6 and alk 75 and ast 19 and alt 22 (prior ast 22 and alt 22). Wbc 5.5 and hg 16.6 and hct 50 and mcv 90.9, platelets 212. Neutrophils 3014 and lymphs 1947. HBV not detected and prior 156.  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 This is called the Precore mutant state and it is a middle stage that you can see where the virus tends to replicate less then it used to before you became a Precore mutant (hence why level not so high even off meds). It is part of the path that many feel towards eventual clearance needs to be seen. Glucose was slightly up at 102 but she may not have been fasting. BUN was 17 creatinine slightly higher at 1.18 from 1.11 prior but prior to that had been 1.17 so not too far from usual. Sodium 140 potassium 4.4 calcium 9.9 albumin 4.7 bilirubin 0.7 alk phos 79 and curiously her AST was normal at 22 and ALT was 22. Your hep B DNA level was only 156 despite being off the medicine which is impressive considering off meds for a bit. Previously your hep B DNA in September had been less than 10 but detected but in May of last year it had been completely not detected. We definitely want it to stay not detected for the best chance to complete the path to clear the hep B possibly. WBC normal at 6 hemoglobin 17 point plate count 247 MCV 88.8 neutrophils 3474 and lymphocytes 1932. Hep B surface antigen unfortunately remains positive and you need to stay on medicine and being suppressed on the virus to have the best chance for that to hopefully clear. I would repeat your labs again after you have been back on the medicines now for about a month and if that stable we will slow it down to every 3 months.  July 27 ultrasound shows partially seen pancreas unremarkable. Liver was homogeneous/even in echotexture and normal in echogenicity with no focal mass. No gallstones were seen and gallbladder was thin-walled. Common bile duct normal at 2 mm. Right kidney 11.7 cm with no stones. Spleen upper normal 12.7 cm. Liver and spleen vessels patent as expected. In summary no overt evidence of cirrhosis or focal mass and patent vessels. Still awaiting labs. I am glad that you are back on your medicines and we need to make sure that you stay on that. Please do labs as we discussed. Called pt and he was updated on this.   Sept 9 2022: hepatitis delta negative and good to know this. Your hep B surface antigen remains positive. Last time the hbv surface antigen was a little bit lower because they had to run it twice to confirm that it was positive.  We just need to watch that and make sure there is remains moving in the right direction. Hep B DNA remains low at less than 10 but the last 2 times it has been less than 10 but not detected. Any lapses in the dosing? Sugar slightly up at 102 BUN of 18 creatinine 1.11 sodium 143 potassium 4.6 calcium 10 albumin 4.8 bilirubin 0.4 alkaline phosphatase 74.  AST up to 51 and ALT 68 which is unusual for you.  Previously her AST was 23 and 20 cardiac 2115. White cell count 6.4 hemoglobin 16.8 plate count 223 MCV 91.8 neutrophils 4026 lymphocytes 1658. He had been off meds due to travel for his job and redo needed in 2 weeks to be sure that he is settling down. He is on the road and will do labs on the road. I will ask Rubia to see if send the labs electronically anywhere he can do a lab.  September 9 2022 ultrasound shows liver to have normal echogenicity and no lesions. Gallbladder with no stones and no thickening. Common bile duct normal at 3.9 mm. Pancreas normal. Right kidney 11 cm.  Spleen 11 cm. Liver vessels patent.   Overall the liver had normal echogenicity and no suspicious lesions.  May 27: wbc 7.4 and hg 17.9 and hct 53.4 and and prior hg 16.4 so wonder if you were running a little dry that day as blood seems concentrated. MCV 90. Platelets 235 normal. Neutrophils 4.7 and lymphs 2.0. Glucose 87 and bun 14 and cr 1.17 and na 144 and k 4.3 and cl 103 and co2 26 and ca 10.1 and alb 4.9 and tb 0.7 and alk 91 and ast 23 and alt 20. Prior ast 21 and alt 15. HBV dna not detected. So medicine working well. Inr 1.0. May want to redo the cbc when well hydrated.   March 25:  U.s pancreas portions normal in size. Liver heterogeneous in echogenicity. No liver lesions. Liver vessels patent. Gallbladder is normal.  Common duct 3mm. Right kidney 10.6cm.  Spleen 11.8cm. Plan to redo in 6m.   March 4 labs show glucose 93 BUN of 12 creatinine slightly up at 1.21 from 1.02 before.  That could potentially be from your hydrational state vs medicine role. You prior have not had issues on the medicine. Suspect due to your work that you may have been running a little bit dehydrated that day it would appear little higher. Need to watch her hydration state to make sure that that stays optimized. Sodium 143 potassium 4.6 chloride 104 CO2 23 albumin 4.8 bilirubin 0.3 alkaline phosphatase 101 AST 21 ALT 15.  Previously AST 22 and ALT 17 so these remain stable. Hep B DNA back to not detected again from previous 70. White cell count 6.7 hemoglobin 16.4 plate count 269 MCV 89.  Neutrophils 4.5 and lymphocytes 1.6. Hep B surface antigen remains positive. Given the work that you are doing not a surprise that you may be running a little bit dehydrated and you need to keep again on top of the hydrational status.   August 13 2021 labs finally back.  White blood cell count 5.9 hemoglobin 16.1 plate count 236 MCV 93.  Neutrophils normal at 3.2.  Glucose 80 BUN of 11 creatinine 1.02.  These are normal.  Sodium slightly up at 145.  Potassium 4.2 chloride 105 CO2 27 albumin 4.4 bilirubin 0.2 alkaline phosphatase 90 AST 22 ALT 17.  Ideal ALT is less than 35 so you are doing well there.  Hep B level is positive at 71 obviously would like to see it undetectable.  Hep B surface antibody has not formed and likely will not unless we keep the virus level undetected and hopefully see the surface antigen clears.  Hep B surface antigen remains positive.  Taking the vemlidy.  July 2 2021 ultrasound back.  They compared it to your prior 2017 scan.  Body of pancreas was unremarkable but portions of it were not well seen due to gas. Liver remains homogeneous with no discrete liver lesion.  Very important to do this every 6 months. Liver vessels patent. Gallbladder unremarkable with no stones seen.  Common bile duct normal at 3 mm. Right kidney 10.3 cm with no hydronephrosis. Spleen 11.9 cm.  Pt had been lost to follow up since 2017 and march 2021.  he is working as a  and he wanda be back on road and staying with ex.  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  Prior to the vemlidy for 2 years were on Viread continuously on this x 5 yrs.   July 2 ultrasound back.  They compared it to your prior 2017 scan.  Body of pancreas was unremarkable but portions of it were not well seen due to gas. Liver remains homogeneous with no discrete liver lesion.  Very important to do this every 6 months. Liver vessels patent. Gallbladder unremarkable with no stones seen.  Common bile duct normal at 3 mm. Right kidney 10.3 cm with no hydronephrosis. Spleen 11.9 cm.  He says he thought he did the labs.  Nov 7 2017: wbc 5.8 hg 15.5 plat 206, glu 119 and little up. Bun 11 cr 1.10, na 144, k 4.2, alb 4.6, tb 0.5, alk 74, ast 24 and alt 19 and hbv dna less than10 and detected.  and amylase and lipase normal. April 2017: wbc 6.8, hg 16.5 plat 224. glu 102, bun 12 cr 1.06, na 142 k 4.4, ast 18 and alt 23 hbv dna not detected. cpk 130 and anymlase and lipase. 73 and 19.   U.s Dec 2017 liver coarsened byt no cirrhotic. Spleen normal.   April 2017 u/s: exam within normal limits. Vessels patent. No gallstones and no liver lesions. Spleen not enlarged.  HBV E antigen is still positive as of that check.   Sept 2015: wbc 7.4, hg 16.3, plat 249. glu 81, cr 1.27, na 144 k 4.3, alk 81 and ast and alt 26 and 24. HBV pcr not detected.   June 30 2015: wbc 6.0, hg 15.8, plat 226, glu 83, bun 11, cr 1.27, ast 27 and alt 22. tb 0.5. HBV less than20 not detected. afp 2.7. eag pos and eab neg.   Plan: 1. Pt will do the u.s in June. 2, Plan to do the labs now. 3. Plan for labs now and then in 3 and see in June when does u.s. 4. Pt will stay on vemlidy. Let us know if issues.  Duration of visit 30  minutes total with 10 min of prep reviewing older info and then 20 min from 1256pm to 116 pm by clock as dox video clock off due to internet fluxes with  time spent reviewing chart and events with pt.

## 2024-02-21 LAB
A/G RATIO: 1.7
ABSOLUTE BASOPHILS: 77
ABSOLUTE EOSINOPHILS: 130
ABSOLUTE LYMPHOCYTES: 2047
ABSOLUTE MONOCYTES: 425
ABSOLUTE NEUTROPHILS: 3221
ALBUMIN: 4.7
ALKALINE PHOSPHATASE: 77
ALT (SGPT): 21
AST (SGOT): 24
BASOPHILS: 1.3
BILIRUBIN, TOTAL: 0.5
BUN/CREATININE RATIO: (no result)
BUN: 15
CALCIUM: 9.9
CARBON DIOXIDE, TOTAL: 29
CHLORIDE: 101
CREATININE: 1.07
EGFR: 82
EOSINOPHILS: 2.2
GLOBULIN, TOTAL: 2.7
GLUCOSE: 101
HBV LOG10: NOT DETECTED
HEMATOCRIT: 46.1
HEMOGLOBIN: 15.8
HEPATITIS B VIRUS DNA: NOT DETECTED
LYMPHOCYTES: 34.7
MCH: 30.5
MCHC: 34.3
MCV: 89
MONOCYTES: 7.2
MPV: 11.4
NEUTROPHILS: 54.6
PLATELET COUNT: 245
POTASSIUM: 3.9
PROTEIN, TOTAL: 7.4
RDW: 12.1
RED BLOOD CELL COUNT: 5.18
SODIUM: 138
WHITE BLOOD CELL COUNT: 5.9

## 2024-03-01 ENCOUNTER — LAB (OUTPATIENT)
Dept: URBAN - METROPOLITAN AREA TELEHEALTH 2 | Facility: TELEHEALTH | Age: 54
End: 2024-03-01

## 2024-05-09 ENCOUNTER — LAB OUTSIDE AN ENCOUNTER (OUTPATIENT)
Dept: URBAN - METROPOLITAN AREA TELEHEALTH 2 | Facility: TELEHEALTH | Age: 54
End: 2024-05-09

## 2024-06-01 ENCOUNTER — LAB OUTSIDE AN ENCOUNTER (OUTPATIENT)
Dept: URBAN - METROPOLITAN AREA TELEHEALTH 2 | Facility: TELEHEALTH | Age: 54
End: 2024-06-01

## 2024-06-14 ENCOUNTER — OFFICE VISIT (OUTPATIENT)
Dept: URBAN - METROPOLITAN AREA CLINIC 86 | Facility: CLINIC | Age: 54
End: 2024-06-14

## 2024-06-14 ENCOUNTER — OFFICE VISIT (OUTPATIENT)
Dept: URBAN - METROPOLITAN AREA CLINIC 91 | Facility: CLINIC | Age: 54
End: 2024-06-14

## 2024-06-14 RX ORDER — AMLODIPINE BESYLATE 10 MG/1
1 TABLET TABLET ORAL ONCE A DAY
Status: ACTIVE | COMMUNITY

## 2024-06-14 RX ORDER — TENOFOVIR ALAFENAMIDE 25 MG/1
TAKE 1 TABLET BY MOUTH ONCE DAILY WITH FOOD TABLET ORAL
Qty: 90 | Refills: 1 | Status: ACTIVE | COMMUNITY

## 2024-06-14 RX ORDER — ATORVASTATIN CALCIUM 10 MG/1
1 TABLET TABLET, FILM COATED ORAL ONCE A DAY
Status: ACTIVE | COMMUNITY

## 2024-06-14 RX ORDER — TENOFOVIR ALAFENAMIDE 25 MG/1
TAKE 1 TABLET BY MOUTH ONCE DAILY WITH FOOD TABLET ORAL
Qty: 90 | Refills: 1

## 2024-06-14 NOTE — HPI-TODAY'S VISIT:
Pt is a 53 year old /Black male, after a previous visit on Feb 2024 for an evaluation for hepatitis B on treatment evaluation.   Dear Dev Hercules, February 20 labs show glucose 101 normal, BUN of 15 and creatinine lower at 1.07 so glad to see renal is better and that is one more reason why Vemlidy is a good choice for you as it helps renal function to be better than Viread. Sodium 138 potassium 3.9 calcium 9.9 albumin 4.7 bilirubin 0.5 alk phos 77 AST 24 and ALT 21 which is even lower than last time when it was 23.  Ideal ALT less than 35. Hep B DNA remains not detected which is very good to see. WBC 5.9 hemoglobin 15.8 platelet count 245 MCV 89 and normal neutrophils and lymphocytes. I hope that you are doing well and very happy to see these labs are doing well.  Please redo these labs in 3 months. Dr. Clark  December 28 2023 ultrasound shows liver echogenicity is increased but with no focal lesions. Common bile duct was 4 mm which is normal. Gallbladder appeared normal. Visualized pancreas portions appeared unremarkable. Liver vessels patent as expected. Splenic vessels patent also. Spleen was 12.1 cm. Right kidney was 12.3 cm with no hydronephrosis. Overall, they felt the liver was mildly echogenic which could represent a mildly fatty liver with no lesions. Patent vessels were seen. Need to continue to work on a low-fat diet if possible given your heavy traveling history for your work. Sadly this puts you at risk to not be able to eat as healthfully as much as you would if you were here local.   December 21 2023 labs show hep B DNA remains not detected as it was back in September and back in December and had been positive at 156. Glucose was 95 BUN 13 creatinine 1.08 which is actually lower than in September. Clearly doing well with the current medicine. Sodium 142 potassium 4.1 calcium 9.9 albumin 4.7 bilirubin 0.7 alk phos down to 70 and AST 21 ALT 23 with ideal ALT less than 35. WBC 5.7 hemoglobin 16.4 platelet 228 MCV 89 with normal neutrophils and lymphocytes. In summary, liver labs are doing well and renal function is stable. Hep B suppression effort is going very well for you. I see you have an u.s pending Dec 28.  Spoke with pt and he had covid 19 recently but did well with it. Need to see how does post the covid 19.  Gave 3 weeks of Vemlidy to get him back on this asap and start appeal process for him to stay back on it.  Sept 8: glucose 114 elevated and bun 17 and cr 1.20 and na 141 and k 4.3 and cl 102 and co2 31 and ca 9.9 and alb 4.7 and tb 0.6 and alk 75 and ast 19 and alt 22 (prior ast 22 and alt 22). Wbc 5.5 and hg 16.6 and hct 50 and mcv 90.9, platelets 212. Neutrophils 3014 and lymphs 1947. HBV not detected and prior 156.  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 This is called the Precore mutant state and it is a middle stage that you can see where the virus tends to replicate less then it used to before you became a Precore mutant (hence why level not so high even off meds). It is part of the path that many feel towards eventual clearance needs to be seen. Glucose was slightly up at 102 but she may not have been fasting. BUN was 17 creatinine slightly higher at 1.18 from 1.11 prior but prior to that had been 1.17 so not too far from usual. Sodium 140 potassium 4.4 calcium 9.9 albumin 4.7 bilirubin 0.7 alk phos 79 and curiously her AST was normal at 22 and ALT was 22. Your hep B DNA level was only 156 despite being off the medicine which is impressive considering off meds for a bit. Previously your hep B DNA in September had been less than 10 but detected but in May of last year it had been completely not detected. We definitely want it to stay not detected for the best chance to complete the path to clear the hep B possibly. WBC normal at 6 hemoglobin 17 point plate count 247 MCV 88.8 neutrophils 3474 and lymphocytes 1932. Hep B surface antigen unfortunately remains positive and you need to stay on medicine and being suppressed on the virus to have the best chance for that to hopefully clear. I would repeat your labs again after you have been back on the medicines now for about a month and if that stable we will slow it down to every 3 months.  July 27 ultrasound shows partially seen pancreas unremarkable. Liver was homogeneous/even in echotexture and normal in echogenicity with no focal mass. No gallstones were seen and gallbladder was thin-walled. Common bile duct normal at 2 mm. Right kidney 11.7 cm with no stones. Spleen upper normal 12.7 cm. Liver and spleen vessels patent as expected. In summary no overt evidence of cirrhosis or focal mass and patent vessels. Still awaiting labs. I am glad that you are back on your medicines and we need to make sure that you stay on that. Please do labs as we discussed. Called pt and he was updated on this.   Sept 9 2022: hepatitis delta negative and good to know this. Your hep B surface antigen remains positive. Last time the hbv surface antigen was a little bit lower because they had to run it twice to confirm that it was positive.  We just need to watch that and make sure there is remains moving in the right direction. Hep B DNA remains low at less than 10 but the last 2 times it has been less than 10 but not detected. Any lapses in the dosing? Sugar slightly up at 102 BUN of 18 creatinine 1.11 sodium 143 potassium 4.6 calcium 10 albumin 4.8 bilirubin 0.4 alkaline phosphatase 74.  AST up to 51 and ALT 68 which is unusual for you.  Previously her AST was 23 and 20 cardiac 2115. White cell count 6.4 hemoglobin 16.8 plate count 223 MCV 91.8 neutrophils 4026 lymphocytes 1658. He had been off meds due to travel for his job and redo needed in 2 weeks to be sure that he is settling down. He is on the road and will do labs on the road. I will ask Rubia to see if send the labs electronically anywhere he can do a lab.  September 9 2022 ultrasound shows liver to have normal echogenicity and no lesions. Gallbladder with no stones and no thickening. Common bile duct normal at 3.9 mm. Pancreas normal. Right kidney 11 cm.  Spleen 11 cm. Liver vessels patent.   Overall the liver had normal echogenicity and no suspicious lesions.  May 27: wbc 7.4 and hg 17.9 and hct 53.4 and and prior hg 16.4 so wonder if you were running a little dry that day as blood seems concentrated. MCV 90. Platelets 235 normal. Neutrophils 4.7 and lymphs 2.0. Glucose 87 and bun 14 and cr 1.17 and na 144 and k 4.3 and cl 103 and co2 26 and ca 10.1 and alb 4.9 and tb 0.7 and alk 91 and ast 23 and alt 20. Prior ast 21 and alt 15. HBV dna not detected. So medicine working well. Inr 1.0. May want to redo the cbc when well hydrated.   March 25:  U.s pancreas portions normal in size. Liver heterogeneous in echogenicity. No liver lesions. Liver vessels patent. Gallbladder is normal.  Common duct 3mm. Right kidney 10.6cm.  Spleen 11.8cm. Plan to redo in 6m.   March 4 labs show glucose 93 BUN of 12 creatinine slightly up at 1.21 from 1.02 before.  That could potentially be from your hydrational state vs medicine role. You prior have not had issues on the medicine. Suspect due to your work that you may have been running a little bit dehydrated that day it would appear little higher. Need to watch her hydration state to make sure that that stays optimized. Sodium 143 potassium 4.6 chloride 104 CO2 23 albumin 4.8 bilirubin 0.3 alkaline phosphatase 101 AST 21 ALT 15.  Previously AST 22 and ALT 17 so these remain stable. Hep B DNA back to not detected again from previous 70. White cell count 6.7 hemoglobin 16.4 plate count 269 MCV 89.  Neutrophils 4.5 and lymphocytes 1.6. Hep B surface antigen remains positive. Given the work that you are doing not a surprise that you may be running a little bit dehydrated and you need to keep again on top of the hydrational status.   August 13 2021 labs finally back.  White blood cell count 5.9 hemoglobin 16.1 plate count 236 MCV 93.  Neutrophils normal at 3.2.  Glucose 80 BUN of 11 creatinine 1.02.  These are normal.  Sodium slightly up at 145.  Potassium 4.2 chloride 105 CO2 27 albumin 4.4 bilirubin 0.2 alkaline phosphatase 90 AST 22 ALT 17.  Ideal ALT is less than 35 so you are doing well there.  Hep B level is positive at 71 obviously would like to see it undetectable.  Hep B surface antibody has not formed and likely will not unless we keep the virus level undetected and hopefully see the surface antigen clears.  Hep B surface antigen remains positive.  Taking the vemlidy.  July 2 2021 ultrasound back.  They compared it to your prior 2017 scan.  Body of pancreas was unremarkable but portions of it were not well seen due to gas. Liver remains homogeneous with no discrete liver lesion.  Very important to do this every 6 months. Liver vessels patent. Gallbladder unremarkable with no stones seen.  Common bile duct normal at 3 mm. Right kidney 10.3 cm with no hydronephrosis. Spleen 11.9 cm.  Pt had been lost to follow up since 2017 and march 2021.  he is working as a  and he wanda be back on road and staying with ex.  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  Prior to the vemlidy for 2 years were on Viread continuously on this x 5 yrs.   July 2 ultrasound back.  They compared it to your prior 2017 scan.  Body of pancreas was unremarkable but portions of it were not well seen due to gas. Liver remains homogeneous with no discrete liver lesion.  Very important to do this every 6 months. Liver vessels patent. Gallbladder unremarkable with no stones seen.  Common bile duct normal at 3 mm. Right kidney 10.3 cm with no hydronephrosis. Spleen 11.9 cm.  He says he thought he did the labs.  Nov 7 2017: wbc 5.8 hg 15.5 plat 206, glu 119 and little up. Bun 11 cr 1.10, na 144, k 4.2, alb 4.6, tb 0.5, alk 74, ast 24 and alt 19 and hbv dna less than10 and detected.  and amylase and lipase normal. April 2017: wbc 6.8, hg 16.5 plat 224. glu 102, bun 12 cr 1.06, na 142 k 4.4, ast 18 and alt 23 hbv dna not detected. cpk 130 and anymlase and lipase. 73 and 19.   U.s Dec 2017 liver coarsened byt no cirrhotic. Spleen normal.   April 2017 u/s: exam within normal limits. Vessels patent. No gallstones and no liver lesions. Spleen not enlarged.  HBV E antigen is still positive as of that check.   Sept 2015: wbc 7.4, hg 16.3, plat 249. glu 81, cr 1.27, na 144 k 4.3, alk 81 and ast and alt 26 and 24. HBV pcr not detected.   June 30 2015: wbc 6.0, hg 15.8, plat 226, glu 83, bun 11, cr 1.27, ast 27 and alt 22. tb 0.5. HBV less than20 not detected. afp 2.7. eag pos and eab neg.   Plan: 1. Pt will do the u.s in June. 2, Plan to do the labs now. 3. Plan for labs now and then in 3 and see in June when does u.s. 4. Pt will stay on vemlidy. Let us know if issues.  Duration of visit   minutes total with 10 min of prep reviewing older info and then 20 min from 1256pm to 116 pm by clock as dox video clock off due to internet fluxes with  time spent reviewing chart and events with pt.

## 2024-06-16 ENCOUNTER — TELEPHONE ENCOUNTER (OUTPATIENT)
Dept: URBAN - METROPOLITAN AREA CLINIC 86 | Facility: CLINIC | Age: 54
End: 2024-06-16

## 2024-06-16 NOTE — HPI-TODAY'S VISIT:
Dear Dev Hercules, June 12 labs: hepatitis B  DNA remains not detected which is great to see. No Doppler medicine is work for a while for you. Your creatinine also remained stable at 1.07 from prior 1.07 and prior 1.08. Glucose a little up at 113 and unclear if you were fasting or not?  BUN of 14.  Sodium 141 potassium 4.8 albumin 3.8 bilirubin 0.4 which is actually little lower alk phos lower at 63 AST 21 and ALT 31 with ideal ALT less than 35. WBC 8.5, hemoglobin 14.9 platelet count 351 MCV 89.3 with normal neutrophils and lymphocytes.  Overall very pleased to see that your labs are stable.  Await your ultrasound. Dr. Clark.

## 2024-07-01 ENCOUNTER — ERX REFILL RESPONSE (OUTPATIENT)
Dept: URBAN - METROPOLITAN AREA CLINIC 86 | Facility: CLINIC | Age: 54
End: 2024-07-01

## 2024-07-01 RX ORDER — TENOFOVIR ALAFENAMIDE 25 MG/1
TAKE 1 TABLET BY MOUTH ONCE DAILY WITH FOOD TABLET ORAL
Qty: 90 | Refills: 0 | OUTPATIENT

## 2024-07-01 RX ORDER — TENOFOVIR ALAFENAMIDE 25 MG/1
TAKE 1 TABLET BY MOUTH ONCE DAILY WITH FOOD TABLET ORAL
Qty: 90 | Refills: 1 | OUTPATIENT

## 2024-09-30 ENCOUNTER — TELEPHONE ENCOUNTER (OUTPATIENT)
Dept: URBAN - METROPOLITAN AREA CLINIC 92 | Facility: CLINIC | Age: 54
End: 2024-09-30

## 2024-09-30 RX ORDER — TENOFOVIR DISPROXIL FUMARATE 300 MG/1
1 TABLET TABLET ORAL ONCE A DAY
Qty: 30 | OUTPATIENT
Start: 2024-09-30

## 2024-09-30 RX ORDER — TENOFOVIR ALAFENAMIDE 25 MG/1
TAKE 1 TABLET BY MOUTH ONCE DAILY WITH FOOD TABLET ORAL
Qty: 30 | Refills: 2

## 2024-10-18 ENCOUNTER — OFFICE VISIT (OUTPATIENT)
Dept: URBAN - METROPOLITAN AREA CLINIC 86 | Facility: CLINIC | Age: 54
End: 2024-10-18

## 2024-10-18 ENCOUNTER — OFFICE VISIT (OUTPATIENT)
Dept: URBAN - METROPOLITAN AREA CLINIC 91 | Facility: CLINIC | Age: 54
End: 2024-10-18

## 2024-11-15 ENCOUNTER — TELEPHONE ENCOUNTER (OUTPATIENT)
Dept: URBAN - METROPOLITAN AREA CLINIC 91 | Facility: CLINIC | Age: 54
End: 2024-11-15

## 2024-11-15 RX ORDER — TENOFOVIR ALAFENAMIDE 25 MG/1
TAKE 1 TABLET BY MOUTH ONCE DAILY WITH FOOD TABLET ORAL
Qty: 30 | Refills: 0

## 2024-11-18 ENCOUNTER — TELEPHONE ENCOUNTER (OUTPATIENT)
Dept: URBAN - METROPOLITAN AREA CLINIC 92 | Facility: CLINIC | Age: 54
End: 2024-11-18

## 2024-11-22 ENCOUNTER — WEB ENCOUNTER (OUTPATIENT)
Dept: URBAN - METROPOLITAN AREA CLINIC 86 | Facility: CLINIC | Age: 54
End: 2024-11-22

## 2024-11-22 ENCOUNTER — TELEPHONE ENCOUNTER (OUTPATIENT)
Dept: URBAN - METROPOLITAN AREA CLINIC 86 | Facility: CLINIC | Age: 54
End: 2024-11-22

## 2024-11-22 LAB
A/G RATIO: 1.5
ABSOLUTE BASOPHILS: 68
ABSOLUTE EOSINOPHILS: 188
ABSOLUTE LYMPHOCYTES: 2622
ABSOLUTE MONOCYTES: 507
ABSOLUTE NEUTROPHILS: 2314
ALBUMIN: 4.4
ALKALINE PHOSPHATASE: 81
ALT (SGPT): 20
AST (SGOT): 18
BASOPHILS: 1.2
BILIRUBIN, TOTAL: 0.5
BUN/CREATININE RATIO: (no result)
BUN: 10
CALCIUM: 9.6
CARBON DIOXIDE, TOTAL: 29
CHLORIDE: 105
CREATININE: 1.11
EGFR: 78
EOSINOPHILS: 3.3
GLOBULIN, TOTAL: 3
GLUCOSE: 109
HBV LOG10: NOT DETECTED
HEMATOCRIT: 48.7
HEMOGLOBIN: 17.3
HEPATITIS B SURFACE ANTIGEN: REACTIVE
HEPATITIS B VIRUS DNA: NOT DETECTED
LYMPHOCYTES: 46
MCH: 31.3
MCHC: 35.5
MCV: 88.1
MONOCYTES: 8.9
MPV: 11.5
NEUTROPHILS: 40.6
PLATELET COUNT: 233
POTASSIUM: 4.8
PROTEIN, TOTAL: 7.4
RDW: 12.7
RED BLOOD CELL COUNT: 5.53
SODIUM: 142
WHITE BLOOD CELL COUNT: 5.7

## 2024-11-22 NOTE — HPI-TODAY'S VISIT:
Dear Dev Hercules,   November 19 labs showed WBC normal at 5.7 and RBC normal at 5.53 but somewhat higher than before at 4.85 in June. Your hemoglobin was elevated at 17.3 hematocrit was upper normal at 48.7 and these are higher than your previous June labs which showed a hemoglobin of 14.9 hematocrit 43.3.  This could be related due to your hydration status and you may have been "a little dry" on the day of the labs.   MCV was 88.1 normal and platelet normal 233.  Neutrophils and lymphocytes normal. Glucose was normal at 109 and BUN was 10 and creatinine slightly higher at 1.11 from previous 1.07. Sodium 142 potassium 4.8 calcium 9.6 albumin 4.4 and bilirubin 0.5.  Alk phos 81 AST of 18 and ALT of 20 with previous AST 21 and ALT 31. Good to see that your liver labs were lower. Your hep B DNA remains not detected. Your hep B surface antigen also remains detected.   Please let us know.  If you need any samples of the medicine to bridge you with the insurance issues.  Please also let us know when you reestablish your insurance coverage.   I filled out the forms that you sent for the assistance program as well and gave them to the staff to fax back.   Have you applied for [facility] assistance program?  They have a great assistance program for people with neuro without insurance coverage and I have had many patients been able to get their test and labs and even there visits with this covered through their program.   Please call Rubia at [phone] extension [phone] to see if you can get that information sent to you.  Called pt and reviewed the info with him.   Dr. Clark

## 2024-12-17 ENCOUNTER — TELEPHONE ENCOUNTER (OUTPATIENT)
Dept: URBAN - METROPOLITAN AREA CLINIC 91 | Facility: CLINIC | Age: 54
End: 2024-12-17

## 2025-02-07 ENCOUNTER — TELEPHONE ENCOUNTER (OUTPATIENT)
Dept: URBAN - METROPOLITAN AREA CLINIC 86 | Facility: CLINIC | Age: 55
End: 2025-02-07

## 2025-02-07 RX ORDER — TENOFOVIR ALAFENAMIDE 25 MG/1
TAKE 1 TABLET BY MOUTH ONCE DAILY WITH FOOD TABLET ORAL
Qty: 30 | Refills: 2

## 2025-02-13 ENCOUNTER — OFFICE VISIT (OUTPATIENT)
Dept: URBAN - METROPOLITAN AREA TELEHEALTH 2 | Facility: TELEHEALTH | Age: 55
End: 2025-02-13

## 2025-02-13 ENCOUNTER — LAB OUTSIDE AN ENCOUNTER (OUTPATIENT)
Dept: URBAN - METROPOLITAN AREA TELEHEALTH 2 | Facility: TELEHEALTH | Age: 55
End: 2025-02-13

## 2025-02-13 ENCOUNTER — DASHBOARD ENCOUNTERS (OUTPATIENT)
Age: 55
End: 2025-02-13

## 2025-02-13 VITALS — BODY MASS INDEX: 31.5 KG/M2 | HEIGHT: 71 IN | WEIGHT: 225 LBS

## 2025-02-13 DIAGNOSIS — Z12.11 SCREENING COLONOSCOPY: ICD-10-CM

## 2025-02-13 DIAGNOSIS — B18.1 CHRONIC HEPATITIS B: ICD-10-CM

## 2025-02-13 DIAGNOSIS — E66.3 OVERWEIGHT: ICD-10-CM

## 2025-02-13 DIAGNOSIS — Z71.89 VACCINE COUNSELING: ICD-10-CM

## 2025-02-13 DIAGNOSIS — E55.9 VITAMIN D DEFICIENCY: ICD-10-CM

## 2025-02-13 DIAGNOSIS — M25.519: ICD-10-CM

## 2025-02-13 DIAGNOSIS — I10 ELEVATED BLOOD PRESSURE: ICD-10-CM

## 2025-02-13 DIAGNOSIS — E78.5 HYPERLIPIDEMIA: ICD-10-CM

## 2025-02-13 DIAGNOSIS — Z79.899 HIGH RISK MEDICATION USE: ICD-10-CM

## 2025-02-13 PROCEDURE — 99214 OFFICE O/P EST MOD 30 MIN: CPT

## 2025-02-13 RX ORDER — AMLODIPINE BESYLATE 10 MG/1
1 TABLET TABLET ORAL ONCE A DAY
Status: ACTIVE | COMMUNITY

## 2025-02-13 RX ORDER — TENOFOVIR ALAFENAMIDE 25 MG/1
TAKE 1 TABLET BY MOUTH ONCE DAILY WITH FOOD TABLET ORAL
Qty: 30 | Refills: 2 | Status: ACTIVE | COMMUNITY

## 2025-02-13 RX ORDER — TENOFOVIR DISPROXIL FUMARATE 300 MG/1
1 TABLET TABLET ORAL ONCE A DAY
Qty: 30 | Status: DISCONTINUED | COMMUNITY
Start: 2024-09-30

## 2025-02-13 RX ORDER — ATORVASTATIN CALCIUM 10 MG/1
1 TABLET TABLET, FILM COATED ORAL ONCE A DAY
Status: ACTIVE | COMMUNITY

## 2025-02-13 NOTE — HPI-TODAY'S VISIT:
Pt is a 54 year old /Black male, after a previous visit on Feb 2024 for an evaluation for hepatitis B on treatment evaluation.   He does not have the [employer] insurance and not active. Looking for something.  November 19 labs showed WBC normal at 5.7 and RBC normal at 5.53 but somewhat higher than before at 4.85 in June. Your hemoglobin was elevated at 17.3 hematocrit was upper normal at 48.7 and these are higher than your previous June labs which showed a hemoglobin of 14.9 hematocrit 43.3. This could be related due to your hydration status and you may have been "a little dry" on the day of the labs. MCV was 88.1 normal and platelet normal 233. Neutrophils and lymphocytes normal. Glucose was normal at 109 and BUN was 10 and creatinine slightly higher at 1.11 from previous 1.07. Sodium 142 potassium 4.8 calcium 9.6 albumin 4.4 and bilirubin 0.5. Alk phos 81 AST of 18 and ALT of 20 with previous AST 21 and ALT 31. Your hep B DNA remains not detected. Your hep B surface antigen also remains detected.  He is approved for the medicine from last week.  [facility]s costs [facility] at [facility]    June 12 labs: hepatitis B DNA remains not detected which is great to see. Your creatinine also remained stable at 1.07 from prior 1.07 and prior 1.08. Glucose a little up at 113 and unclear if you were fasting or not? BUN of 14. Sodium 141 potassium 4.8 albumin 3.8 bilirubin 0.4 which is actually little lower alk phos lower at 63 AST 21 and ALT 31 with ideal ALT less than 35. WBC 8.5, hemoglobin 14.9 platelet count 351 MCV 89.3 with normal neutrophils and lymphocytes. Overall very pleased to see that your labs are stable. Await your ultrasound.  February 20 labs show glucose 101 normal, BUN of 15 and creatinine lower at 1.07 so glad to see renal is better and that is one more reason why Vemlidy is a good choice for you as it helps renal function to be better than Viread. Sodium 138 potassium 3.9 calcium 9.9 albumin 4.7 bilirubin 0.5 alk phos 77 AST 24 and ALT 21 which is even lower than last time when it was 23.  Ideal ALT less than 35. Hep B DNA remains not detected which is very good to see. WBC 5.9 hemoglobin 15.8 platelet count 245 MCV 89 and normal neutrophils and lymphocytes. I hope that you are doing well and very happy to see these labs are doing well.  Please redo these labs in 3 months.   December 28 2023 ultrasound shows liver echogenicity is increased but with no focal lesions. Common bile duct was 4 mm which is normal. Gallbladder appeared normal. Visualized pancreas portions appeared unremarkable. Liver vessels patent as expected. Splenic vessels patent also. Spleen was 12.1 cm. Right kidney was 12.3 cm with no hydronephrosis. Overall, they felt the liver was mildly echogenic which could represent a mildly fatty liver with no lesions. Patent vessels were seen. Need to continue to work on a low-fat diet if possible given your heavy traveling history for your work. Sadly this puts you at risk to not be able to eat as healthfully as much as you would if you were here local.   December 21 2023 labs show hep B DNA remains not detected as it was back in September and back in December and had been positive at 156. Glucose was 95 BUN 13 creatinine 1.08 which is actually lower than in September. Clearly doing well with the current medicine. Sodium 142 potassium 4.1 calcium 9.9 albumin 4.7 bilirubin 0.7 alk phos down to 70 and AST 21 ALT 23 with ideal ALT less than 35. WBC 5.7 hemoglobin 16.4 platelet 228 MCV 89 with normal neutrophils and lymphocytes. In summary, liver labs are doing well and renal function is stable. Hep B suppression effort is going very well for you. I see you have an u.s pending Dec 28.  Spoke with pt and he had covid 19 recently but did well with it. Need to see how does post the covid 19.  Gave 3 weeks of Vemlidy to get him back on this asap and start appeal process for him to stay back on it.  Sept 8: glucose 114 elevated and bun 17 and cr 1.20 and na 141 and k 4.3 and cl 102 and co2 31 and ca 9.9 and alb 4.7 and tb 0.6 and alk 75 and ast 19 and alt 22 (prior ast 22 and alt 22). Wbc 5.5 and hg 16.6 and hct 50 and mcv 90.9, platelets 212. Neutrophils 3014 and lymphs 1947. HBV not detected and prior 156.  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 This is called the Precore mutant state and it is a middle stage that you can see where the virus tends to replicate less then it used to before you became a Precore mutant (hence why level not so high even off meds). It is part of the path that many feel towards eventual clearance needs to be seen. Glucose was slightly up at 102 but she may not have been fasting. BUN was 17 creatinine slightly higher at 1.18 from 1.11 prior but prior to that had been 1.17 so not too far from usual. Sodium 140 potassium 4.4 calcium 9.9 albumin 4.7 bilirubin 0.7 alk phos 79 and curiously her AST was normal at 22 and ALT was 22. Your hep B DNA level was only 156 despite being off the medicine which is impressive considering off meds for a bit. Previously your hep B DNA in September had been less than 10 but detected but in May of last year it had been completely not detected. We definitely want it to stay not detected for the best chance to complete the path to clear the hep B possibly. WBC normal at 6 hemoglobin 17 point plate count 247 MCV 88.8 neutrophils 3474 and lymphocytes 1932. Hep B surface antigen unfortunately remains positive and you need to stay on medicine and being suppressed on the virus to have the best chance for that to hopefully clear. I would repeat your labs again after you have been back on the medicines now for about a month and if that stable we will slow it down to every 3 months.  July 27 ultrasound shows partially seen pancreas unremarkable. Liver was homogeneous/even in echotexture and normal in echogenicity with no focal mass. No gallstones were seen and gallbladder was thin-walled. Common bile duct normal at 2 mm. Right kidney 11.7 cm with no stones. Spleen upper normal 12.7 cm. Liver and spleen vessels patent as expected. In summary no overt evidence of cirrhosis or focal mass and patent vessels. Still awaiting labs. I am glad that you are back on your medicines and we need to make sure that you stay on that. Please do labs as we discussed. Called pt and he was updated on this.   Sept 9 2022: hepatitis delta negative and good to know this. Your hep B surface antigen remains positive. Last time the hbv surface antigen was a little bit lower because they had to run it twice to confirm that it was positive.  We just need to watch that and make sure there is remains moving in the right direction. Hep B DNA remains low at less than 10 but the last 2 times it has been less than 10 but not detected. Any lapses in the dosing? Sugar slightly up at 102 BUN of 18 creatinine 1.11 sodium 143 potassium 4.6 calcium 10 albumin 4.8 bilirubin 0.4 alkaline phosphatase 74.  AST up to 51 and ALT 68 which is unusual for you.  Previously her AST was 23 and 20 cardiac 2115. White cell count 6.4 hemoglobin 16.8 plate count 223 MCV 91.8 neutrophils 4026 lymphocytes 1658. He had been off meds due to travel for his job and redo needed in 2 weeks to be sure that he is settling down. He is on the road and will do labs on the road. I will ask Rubia to see if send the labs electronically anywhere he can do a lab.  September 9 2022 ultrasound shows liver to have normal echogenicity and no lesions. Gallbladder with no stones and no thickening. Common bile duct normal at 3.9 mm. Pancreas normal. Right kidney 11 cm.  Spleen 11 cm. Liver vessels patent.   Overall the liver had normal echogenicity and no suspicious lesions.  May 27: wbc 7.4 and hg 17.9 and hct 53.4 and and prior hg 16.4 so wonder if you were running a little dry that day as blood seems concentrated. MCV 90. Platelets 235 normal. Neutrophils 4.7 and lymphs 2.0. Glucose 87 and bun 14 and cr 1.17 and na 144 and k 4.3 and cl 103 and co2 26 and ca 10.1 and alb 4.9 and tb 0.7 and alk 91 and ast 23 and alt 20. Prior ast 21 and alt 15. HBV dna not detected. So medicine working well. Inr 1.0. May want to redo the cbc when well hydrated.   March 25:  U.s pancreas portions normal in size. Liver heterogeneous in echogenicity. No liver lesions. Liver vessels patent. Gallbladder is normal.  Common duct 3mm. Right kidney 10.6cm.  Spleen 11.8cm. Plan to redo in 6m.   March 4 labs show glucose 93 BUN of 12 creatinine slightly up at 1.21 from 1.02 before.  That could potentially be from your hydrational state vs medicine role. You prior have not had issues on the medicine. Suspect due to your work that you may have been running a little bit dehydrated that day it would appear little higher. Need to watch her hydration state to make sure that that stays optimized. Sodium 143 potassium 4.6 chloride 104 CO2 23 albumin 4.8 bilirubin 0.3 alkaline phosphatase 101 AST 21 ALT 15.  Previously AST 22 and ALT 17 so these remain stable. Hep B DNA back to not detected again from previous 70. White cell count 6.7 hemoglobin 16.4 plate count 269 MCV 89.  Neutrophils 4.5 and lymphocytes 1.6. Hep B surface antigen remains positive. Given the work that you are doing not a surprise that you may be running a little bit dehydrated and you need to keep again on top of the hydrational status.   August 13 2021 labs finally back.  White blood cell count 5.9 hemoglobin 16.1 plate count 236 MCV 93.  Neutrophils normal at 3.2.  Glucose 80 BUN of 11 creatinine 1.02.  These are normal.  Sodium slightly up at 145.  Potassium 4.2 chloride 105 CO2 27 albumin 4.4 bilirubin 0.2 alkaline phosphatase 90 AST 22 ALT 17.  Ideal ALT is less than 35 so you are doing well there.  Hep B level is positive at 71 obviously would like to see it undetectable.  Hep B surface antibody has not formed and likely will not unless we keep the virus level undetected and hopefully see the surface antigen clears.  Hep B surface antigen remains positive.  Taking the vemlidy.  July 2 2021 ultrasound back.  They compared it to your prior 2017 scan.  Body of pancreas was unremarkable but portions of it were not well seen due to gas. Liver remains homogeneous with no discrete liver lesion.  Very important to do this every 6 months. Liver vessels patent. Gallbladder unremarkable with no stones seen.  Common bile duct normal at 3 mm. Right kidney 10.3 cm with no hydronephrosis. Spleen 11.9 cm.  Pt had been lost to follow up since 2017 and march 2021.  he is working as a  and he wanda be back on road and staying with ex.  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  Prior to the vemlidy for 2 years were on Viread continuously on this x 5 yrs.   July 2 ultrasound back.  They compared it to your prior 2017 scan.  Body of pancreas was unremarkable but portions of it were not well seen due to gas. Liver remains homogeneous with no discrete liver lesion.  Very important to do this every 6 months. Liver vessels patent. Gallbladder unremarkable with no stones seen.  Common bile duct normal at 3 mm. Right kidney 10.3 cm with no hydronephrosis. Spleen 11.9 cm.  He says he thought he did the labs.  Nov 7 2017: wbc 5.8 hg 15.5 plat 206, glu 119 and little up. Bun 11 cr 1.10, na 144, k 4.2, alb 4.6, tb 0.5, alk 74, ast 24 and alt 19 and hbv dna less than10 and detected.  and amylase and lipase normal. April 2017: wbc 6.8, hg 16.5 plat 224. glu 102, bun 12 cr 1.06, na 142 k 4.4, ast 18 and alt 23 hbv dna not detected. cpk 130 and anymlase and lipase. 73 and 19.   U.s Dec 2017 liver coarsened byt no cirrhotic. Spleen normal.   April 2017 u/s: exam within normal limits. Vessels patent. No gallstones and no liver lesions. Spleen not enlarged.  HBV E antigen is still positive as of that check.   Sept 2015: wbc 7.4, hg 16.3, plat 249. glu 81, cr 1.27, na 144 k 4.3, alk 81 and ast and alt 26 and 24. HBV pcr not detected.   June 30 2015: wbc 6.0, hg 15.8, plat 226, glu 83, bun 11, cr 1.27, ast 27 and alt 22. tb 0.5. HBV less than20 not detected. afp 2.7. eag pos and eab neg.   Plan: 1. Pt will do the u[facility] at [facility] for [facility].00 in [place]. 2. Pt is working on the insurance. 3. Pt was approved for meds until then. 4. pt will do labs in 3 and 6m assuming insurance and if any lab tests can be done. 5. Pt will stay on meds.   Duration of visit  30 minutes total with 15 min of prep reviewing older info and then 15 min  by [facility]  with time spent  reviewing chart and events with pt.

## 2025-03-01 ENCOUNTER — LAB OUTSIDE AN ENCOUNTER (OUTPATIENT)
Dept: URBAN - METROPOLITAN AREA TELEHEALTH 2 | Facility: TELEHEALTH | Age: 55
End: 2025-03-01

## 2025-04-23 NOTE — HPI-TODAY'S VISIT:
Will prescribe Zetia to treat hyperlipidemia since he declines rosuvastatin or any other statin.   Dear Dev Hercules, Sept 9: hepatitis delta negative and good to know this. Your hep B surface antigen remains positive. Last time the hbv surface antigen was a little bit lower because they had to run it twice to confirm that it was positive.  We just need to watch that and make sure there is remains moving in the right direction. Hep B DNA remains low at less than 10 but the last 2 times it has been less than 10 but not detected. Any lapses in the dosing? Sugar slightly up at 102 BUN of 18 creatinine 1.11 sodium 143 potassium 4.6 calcium 10 albumin 4.8 bilirubin 0.4 alkaline phosphatase 74.  AST up to 51 and ALT 68 which is unusual for you.  Previously her AST was 23 and 20 cardiac 2115. White cell count 6.4 hemoglobin 16.8 plate count 223 MCV 91.8 neutrophils 4026 lymphocytes 1658. He had been off meds due to travel for his job and redo needed in 2 weeks to be sure that he is settling down. He is on the road and will do labs on the road. I will ask Rubia to see if send the labs electronically anywhere he can do a lab. He say he can also print off labs at some locations. Dr Clark

## 2025-04-24 ENCOUNTER — TELEPHONE ENCOUNTER (OUTPATIENT)
Dept: URBAN - METROPOLITAN AREA CLINIC 86 | Facility: CLINIC | Age: 55
End: 2025-04-24

## 2025-04-24 RX ORDER — TENOFOVIR ALAFENAMIDE 25 MG/1
TAKE 1 TABLET BY MOUTH ONCE DAILY WITH FOOD TABLET ORAL
Qty: 30 | Refills: 2
End: 2025-07-23

## 2025-04-28 ENCOUNTER — TELEPHONE ENCOUNTER (OUTPATIENT)
Dept: URBAN - METROPOLITAN AREA CLINIC 86 | Facility: CLINIC | Age: 55
End: 2025-04-28

## 2025-04-28 RX ORDER — TENOFOVIR ALAFENAMIDE 25 MG/1
TAKE 1 TABLET BY MOUTH ONCE DAILY WITH FOOD TABLET ORAL
Qty: 30 | Refills: 1

## 2025-06-01 ENCOUNTER — LAB OUTSIDE AN ENCOUNTER (OUTPATIENT)
Dept: URBAN - METROPOLITAN AREA TELEHEALTH 2 | Facility: TELEHEALTH | Age: 55
End: 2025-06-01

## 2025-06-16 ENCOUNTER — LAB OUTSIDE AN ENCOUNTER (OUTPATIENT)
Dept: URBAN - METROPOLITAN AREA CLINIC 86 | Facility: CLINIC | Age: 55
End: 2025-06-16

## 2025-06-16 ENCOUNTER — TELEPHONE ENCOUNTER (OUTPATIENT)
Dept: URBAN - METROPOLITAN AREA CLINIC 86 | Facility: CLINIC | Age: 55
End: 2025-06-16

## 2025-06-16 ENCOUNTER — OFFICE VISIT (OUTPATIENT)
Dept: URBAN - METROPOLITAN AREA TELEHEALTH 2 | Facility: TELEHEALTH | Age: 55
End: 2025-06-16

## 2025-06-16 RX ORDER — AMLODIPINE BESYLATE 10 MG/1
1 TABLET TABLET ORAL ONCE A DAY
Status: ACTIVE | COMMUNITY

## 2025-06-16 RX ORDER — ATORVASTATIN CALCIUM 10 MG/1
1 TABLET TABLET, FILM COATED ORAL ONCE A DAY
Status: ACTIVE | COMMUNITY

## 2025-06-16 RX ORDER — TENOFOVIR ALAFENAMIDE 25 MG/1
TAKE 1 TABLET BY MOUTH ONCE DAILY WITH FOOD TABLET ORAL
Qty: 30 | Refills: 1 | Status: ACTIVE | COMMUNITY

## 2025-06-16 NOTE — HPI-TODAY'S VISIT:
Pt is a 54 year old /Black male, after a previous visit on Feb 2025 for an evaluation for hepatitis B on treatment evaluation.    He does not have the [employer] insurance and not active. Looking for something.    November 19 labs showed WBC normal at 5.7 and RBC normal at 5.53 but somewhat higher than before at 4.85 in June.  Your hemoglobin was elevated at 17.3 hematocrit was upper normal at 48.7 and these are higher than your previous June labs which showed a hemoglobin of 14.9 hematocrit 43.3. This could be related due to your hydration status and you may have been "a little dry" on the day of the labs.  MCV was 88.1 normal and platelet normal 233. Neutrophils and lymphocytes normal.  Glucose was normal at 109 and BUN was 10 and creatinine slightly higher at 1.11 from previous 1.07.  Sodium 142 potassium 4.8 calcium 9.6 albumin 4.4 and bilirubin 0.5. Alk phos 81 AST of 18 and ALT of 20 with previous AST 21 and ALT 31.  Your hep B DNA remains not detected.  Your hep B surface antigen also remains detected.    He is approved for the medicine from last week.    U.s costs $90 at [facility]       June 12 labs: hepatitis B DNA remains not detected which is great to see.  Your creatinine also remained stable at 1.07 from prior 1.07 and prior 1.08.  Glucose a little up at 113 and unclear if you were fasting or not? BUN of 14. Sodium 141 potassium 4.8 albumin 3.8 bilirubin 0.4 which is actually little lower alk phos lower at 63 AST 21 and ALT 31 with ideal ALT less than 35.  WBC 8.5, hemoglobin 14.9 platelet count 351 MCV 89.3 with normal neutrophils and lymphocytes. Overall very pleased to see that your labs are stable. Await your ultrasound.    February 20 labs show glucose 101 normal, BUN of 15 and creatinine lower at 1.07 so glad to see renal is better and that is one more reason why Vemlidy is a good choice for you as it helps renal function to be better than Viread.  Sodium 138 potassium 3.9 calcium 9.9 albumin 4.7 bilirubin 0.5 alk phos 77 AST 24 and ALT 21 which is even lower than last time when it was 23.  Ideal ALT less than 35.  Hep B DNA remains not detected which is very good to see.  WBC 5.9 hemoglobin 15.8 platelet count 245 MCV 89 and normal neutrophils and lymphocytes.  I hope that you are doing well and very happy to see these labs are doing well.  Please redo these labs in 3 months.      December 28 2023 ultrasound shows liver echogenicity is increased but with no focal lesions. Common bile duct was 4 mm which is normal. Gallbladder appeared normal.  Visualized pancreas portions appeared unremarkable.  Liver vessels patent as expected. Splenic vessels patent also. Spleen was 12.1 cm.  Right kidney was 12.3 cm with no hydronephrosis.  Overall, they felt the liver was mildly echogenic which could represent a mildly fatty liver with no lesions. Patent vessels were seen.  Need to continue to work on a low-fat diet if possible given your heavy traveling history for your work. Sadly this puts you at risk to not be able to eat as healthfully as much as you would if you were here local.      December 21 2023 labs show hep B DNA remains not detected as it was back in September and back in December and had been positive at 156.  Glucose was 95 BUN 13 creatinine 1.08 which is actually lower than in September. Clearly doing well with the current medicine.  Sodium 142 potassium 4.1 calcium 9.9 albumin 4.7 bilirubin 0.7 alk phos down to 70 and AST 21 ALT 23 with ideal ALT less than 35.  WBC 5.7 hemoglobin 16.4 platelet 228 MCV 89 with normal neutrophils and lymphocytes.  In summary, liver labs are doing well and renal function is stable. Hep B suppression effort is going very well for you.  I see you have an u.s pending Dec 28.    Spoke with pt and he had covid 19 recently but did well with it. Need to see how does post the covid 19.    Gave 3 weeks of Vemlidy to get him back on this asap and start appeal process for him to stay back on it.    Sept 8: glucose 114 elevated and bun 17 and cr 1.20 and na 141 and k 4.3 and cl 102 and co2 31 and ca 9.9 and alb 4.7 and tb 0.6 and alk 75 and ast 19 and alt 22 (prior ast 22 and alt 22). Wbc 5.5 and hg 16.6 and hct 50 and mcv 90.9, platelets 212. Neutrophils 3014 and lymphs 1947. HBV not detected and prior 156.    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 This is called the Precore mutant state and it is a middle stage that you can see where the virus tends to replicate less then it used to before you became a Precore mutant (hence why level not so high even off meds). It is part of the path that many feel towards eventual clearance needs to be seen.  Glucose was slightly up at 102 but she may not have been fasting. BUN was 17 creatinine slightly higher at 1.18 from 1.11 prior but prior to that had been 1.17 so not too far from usual. Sodium 140 potassium 4.4 calcium 9.9 albumin 4.7 bilirubin 0.7 alk phos 79 and curiously her AST was normal at 22 and ALT was 22.  Your hep B DNA level was only 156 despite being off the medicine which is impressive considering off meds for a bit. Previously your hep B DNA in September had been less than 10 but detected but in May of last year it had been completely not detected. We definitely want it to stay not detected for the best chance to complete the path to clear the hep B possibly.  WBC normal at 6 hemoglobin 17 point plate count 247 MCV 88.8 neutrophils 3474 and lymphocytes 1932.  Hep B surface antigen unfortunately remains positive and you need to stay on medicine and being suppressed on the virus to have the best chance for that to hopefully clear.  I would repeat your labs again after you have been back on the medicines now for about a month and if that stable we will slow it down to every 3 months.    July 27 ultrasound shows partially seen pancreas unremarkable.  Liver was homogeneous/even in echotexture and normal in echogenicity with no focal mass.  No gallstones were seen and gallbladder was thin-walled.  Common bile duct normal at 2 mm.  Right kidney 11.7 cm with no stones.  Spleen upper normal 12.7 cm. Liver and spleen vessels patent as expected.  In summary no overt evidence of cirrhosis or focal mass and patent vessels.  Still awaiting labs.  I am glad that you are back on your medicines and we need to make sure that you stay on that.  Please do labs as we discussed.  Called pt and he was updated on this.      Sept 9 2022: hepatitis delta negative and good to know this.  Your hep B surface antigen remains positive.  Last time the hbv surface antigen was a little bit lower because they had to run it twice to confirm that it was positive. We just need to watch that and make sure there is remains moving in the right direction.  Hep B DNA remains low at less than 10 but the last 2 times it has been less than 10 but not detected.  Any lapses in the dosing?  Sugar slightly up at 102 BUN of 18 creatinine 1.11 sodium 143 potassium 4.6 calcium 10 albumin 4.8 bilirubin 0.4 alkaline phosphatase 74. AST up to 51 and ALT 68 which is unusual for you. Previously her AST was 23 and 20 cardiac 2115.  White cell count 6.4 hemoglobin 16.8 plate count 223 MCV 91.8 neutrophils 4026 lymphocytes 1658.  He had been off meds due to travel for his job and redo needed in 2 weeks to be sure that he is settling down.  He is on the road and will do labs on the road. I will ask Rubia to see if send the labs electronically anywhere he can do a lab.    September 9 2022 ultrasound shows liver to have normal echogenicity and no lesions.  Gallbladder with no stones and no thickening.  Common bile duct normal at 3.9 mm.  Pancreas normal.  Right kidney 11 cm. Spleen 11 cm.  Liver vessels patent.  Overall the liver had normal echogenicity and no suspicious lesions.    May 27: wbc 7.4 and hg 17.9 and hct 53.4 and and prior hg 16.4 so wonder if you were running a little dry that day as blood seems concentrated.  MCV 90.  Platelets 235 normal.  Neutrophils 4.7 and lymphs 2.0.  Glucose 87 and bun 14 and cr 1.17 and na 144 and k 4.3 and cl 103 and co2 26 and ca 10.1 and alb 4.9 and tb 0.7 and alk 91 and ast 23 and alt 20. Prior ast 21 and alt 15.  HBV dna not detected. So medicine working well.  Inr 1.0.  May want to redo the cbc when well hydrated.    March 25:  U.s pancreas portions normal in size.  Liver heterogeneous in echogenicity.  No liver lesions.  Liver vessels patent.  Gallbladder is normal.  Common duct 3mm.  Right kidney 10.6cm.  Spleen 11.8cm.  Plan to redo in 6m.      March 4 labs show glucose 93 BUN of 12 creatinine slightly up at 1.21 from 1.02 before. That could potentially be from your hydrational state vs medicine role. You prior have not had issues on the medicine. Suspect due to your work that you may have been running a little bit dehydrated that day it would appear little higher.  Need to watch her hydration state to make sure that that stays optimized.  Sodium 143 potassium 4.6 chloride 104 CO2 23 albumin 4.8 bilirubin 0.3 alkaline phosphatase 101 AST 21 ALT 15. Previously AST 22 and ALT 17 so these remain stable.  Hep B DNA back to not detected again from previous 70.  White cell count 6.7 hemoglobin 16.4 plate count 269 MCV 89. Neutrophils 4.5 and lymphocytes 1.6.  Hep B surface antigen remains positive.  Given the work that you are doing not a surprise that you may be running a little bit dehydrated and you need to keep again on top of the hydrational status.      August 13 2021 labs finally back. White blood cell count 5.9 hemoglobin 16.1 plate count 236 MCV 93. Neutrophils normal at 3.2. Glucose 80 BUN of 11 creatinine 1.02. These are normal. Sodium slightly up at 145. Potassium 4.2 chloride 105 CO2 27 albumin 4.4 bilirubin 0.2 alkaline phosphatase 90 AST 22 ALT 17. Ideal ALT is less than 35 so you are doing well there. Hep B level is positive at 71 obviously would like to see it undetectable. Hep B surface antibody has not formed and likely will not unless we keep the virus level undetected and hopefully see the surface antigen clears. Hep B surface antigen remains positive.    Taking the vemlidy.    July 2 2021 ultrasound back. They compared it to your prior 2017 scan. Body of pancreas was unremarkable but portions of it were not well seen due to gas.  Liver remains homogeneous with no discrete liver lesion. Very important to do this every 6 months.  Liver vessels patent.  Gallbladder unremarkable with no stones seen. Common bile duct normal at 3 mm.  Right kidney 10.3 cm with no hydronephrosis.  Spleen 11.9 cm.    Pt had been lost to follow up since 2017 and march 2021.    he is working as a  and he wanda be back on road and staying with ex.    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    Prior to the vemlidy for 2 years were on Viread continuously on this x 5 yrs.    July 2 ultrasound back. They compared it to your prior 2017 scan. Body of pancreas was unremarkable but portions of it were not well seen due to gas.  Liver remains homogeneous with no discrete liver lesion. Very important to do this every 6 months.  Liver vessels patent.  Gallbladder unremarkable with no stones seen. Common bile duct normal at 3 mm.  Right kidney 10.3 cm with no hydronephrosis.  Spleen 11.9 cm.    He says he thought he did the labs.    Nov 7 2017: wbc 5.8 hg 15.5 plat 206, glu 119 and little up. Bun 11 cr 1.10, na 144, k 4.2, alb 4.6, tb 0.5, alk 74, ast 24 and alt 19 and hbv dna less than10 and detected.  and amylase and lipase normal. April 2017: wbc 6.8, hg 16.5 plat 224. glu 102, bun 12 cr 1.06, na 142 k 4.4, ast 18 and alt 23 hbv dna not detected. cpk 130 and anymlase and lipase. 73 and 19.    U.s Dec 2017 liver coarsened byt no cirrhotic. Spleen normal.    April 2017 u/s: exam within normal limits. Vessels patent. No gallstones and no liver lesions. Spleen not enlarged.    HBV E antigen is still positive as of that check.    Sept 2015: wbc 7.4, hg 16.3, plat 249. glu 81, cr 1.27, na 144 k 4.3, alk 81 and ast and alt 26 and 24. HBV pcr not detected.    June 30 2015: wbc 6.0, hg 15.8, plat 226, glu 83, bun 11, cr 1.27, ast 27 and alt 22. tb 0.5. HBV less than20 not detected. afp 2.7. eag pos and eab neg.    Plan:  1. Pt will do the u.s at [facility] for 90.00 in [place].  2. Pt is working on the insurance.  3. Pt was approved for meds until then.  4. pt will do labs in 3 and 6m assuming insurance and if any lab tests can be done.  5. Pt will stay on meds.      Duration of visit   minutes total with 10 min of prep reviewing older info and then 20 min  by jeanne telemed  with time spent  reviewing chart and events with pt.